# Patient Record
Sex: MALE | Race: BLACK OR AFRICAN AMERICAN | NOT HISPANIC OR LATINO | URBAN - METROPOLITAN AREA
[De-identification: names, ages, dates, MRNs, and addresses within clinical notes are randomized per-mention and may not be internally consistent; named-entity substitution may affect disease eponyms.]

---

## 2017-01-12 ENCOUNTER — INPATIENT (INPATIENT)
Facility: HOSPITAL | Age: 62
LOS: 18 days | Discharge: HOME | End: 2017-01-31
Attending: INTERNAL MEDICINE

## 2017-04-13 ENCOUNTER — INPATIENT (INPATIENT)
Facility: HOSPITAL | Age: 62
LOS: 14 days | Discharge: NURSING, REHAB OR LONG-TERM CARE | End: 2017-04-28
Attending: PSYCHIATRY & NEUROLOGY

## 2017-05-09 ENCOUNTER — INPATIENT (INPATIENT)
Facility: HOSPITAL | Age: 62
LOS: 0 days | Discharge: HOME | End: 2017-05-10
Attending: RADIOLOGY

## 2017-06-09 ENCOUNTER — INPATIENT (INPATIENT)
Facility: HOSPITAL | Age: 62
LOS: 20 days | Discharge: HOME | End: 2017-06-30
Attending: PSYCHIATRY & NEUROLOGY

## 2017-06-09 DIAGNOSIS — F43.10 POST-TRAUMATIC STRESS DISORDER, UNSPECIFIED: ICD-10-CM

## 2017-06-09 DIAGNOSIS — F41.9 ANXIETY DISORDER, UNSPECIFIED: ICD-10-CM

## 2017-06-09 DIAGNOSIS — F14.10 COCAINE ABUSE, UNCOMPLICATED: ICD-10-CM

## 2017-06-09 DIAGNOSIS — E66.9 OBESITY, UNSPECIFIED: ICD-10-CM

## 2017-06-09 DIAGNOSIS — I10 ESSENTIAL (PRIMARY) HYPERTENSION: ICD-10-CM

## 2017-06-09 DIAGNOSIS — F10.20 ALCOHOL DEPENDENCE, UNCOMPLICATED: ICD-10-CM

## 2017-06-09 DIAGNOSIS — F32.9 MAJOR DEPRESSIVE DISORDER, SINGLE EPISODE, UNSPECIFIED: ICD-10-CM

## 2017-06-09 DIAGNOSIS — N40.1 BENIGN PROSTATIC HYPERPLASIA WITH LOWER URINARY TRACT SYMPTOMS: ICD-10-CM

## 2017-06-09 DIAGNOSIS — E78.00 PURE HYPERCHOLESTEROLEMIA, UNSPECIFIED: ICD-10-CM

## 2017-06-09 DIAGNOSIS — Z76.5 MALINGERER [CONSCIOUS SIMULATION]: ICD-10-CM

## 2017-06-09 DIAGNOSIS — I25.10 ATHEROSCLEROTIC HEART DISEASE OF NATIVE CORONARY ARTERY WITHOUT ANGINA PECTORIS: ICD-10-CM

## 2017-06-09 DIAGNOSIS — T14.91 SUICIDE ATTEMPT: ICD-10-CM

## 2017-06-09 DIAGNOSIS — R56.9 UNSPECIFIED CONVULSIONS: ICD-10-CM

## 2017-06-09 DIAGNOSIS — K92.2 GASTROINTESTINAL HEMORRHAGE, UNSPECIFIED: ICD-10-CM

## 2017-06-09 DIAGNOSIS — M25.559 PAIN IN UNSPECIFIED HIP: ICD-10-CM

## 2017-06-09 DIAGNOSIS — E10.9 TYPE 1 DIABETES MELLITUS WITHOUT COMPLICATIONS: ICD-10-CM

## 2017-06-09 DIAGNOSIS — M54.9 DORSALGIA, UNSPECIFIED: ICD-10-CM

## 2017-06-09 DIAGNOSIS — D64.9 ANEMIA, UNSPECIFIED: ICD-10-CM

## 2017-06-09 DIAGNOSIS — F39 UNSPECIFIED MOOD [AFFECTIVE] DISORDER: ICD-10-CM

## 2017-06-09 DIAGNOSIS — F31.9 BIPOLAR DISORDER, UNSPECIFIED: ICD-10-CM

## 2017-06-09 DIAGNOSIS — K21.9 GASTRO-ESOPHAGEAL REFLUX DISEASE WITHOUT ESOPHAGITIS: ICD-10-CM

## 2017-06-09 DIAGNOSIS — G62.9 POLYNEUROPATHY, UNSPECIFIED: ICD-10-CM

## 2017-06-09 DIAGNOSIS — J45.909 UNSPECIFIED ASTHMA, UNCOMPLICATED: ICD-10-CM

## 2017-06-09 DIAGNOSIS — T50.902A POISONING BY UNSPECIFIED DRUGS, MEDICAMENTS AND BIOLOGICAL SUBSTANCES, INTENTIONAL SELF-HARM, INITIAL ENCOUNTER: ICD-10-CM

## 2017-06-28 DIAGNOSIS — K21.9 GASTRO-ESOPHAGEAL REFLUX DISEASE WITHOUT ESOPHAGITIS: ICD-10-CM

## 2017-06-28 DIAGNOSIS — F33.2 MAJOR DEPRESSIVE DISORDER, RECURRENT SEVERE WITHOUT PSYCHOTIC FEATURES: ICD-10-CM

## 2017-06-28 DIAGNOSIS — F10.20 ALCOHOL DEPENDENCE, UNCOMPLICATED: ICD-10-CM

## 2017-06-28 DIAGNOSIS — E78.00 PURE HYPERCHOLESTEROLEMIA, UNSPECIFIED: ICD-10-CM

## 2017-06-28 DIAGNOSIS — N40.0 BENIGN PROSTATIC HYPERPLASIA WITHOUT LOWER URINARY TRACT SYMPTOMS: ICD-10-CM

## 2017-06-28 DIAGNOSIS — E11.40 TYPE 2 DIABETES MELLITUS WITH DIABETIC NEUROPATHY, UNSPECIFIED: ICD-10-CM

## 2017-07-06 DIAGNOSIS — F12.10 CANNABIS ABUSE, UNCOMPLICATED: ICD-10-CM

## 2017-07-06 DIAGNOSIS — F43.10 POST-TRAUMATIC STRESS DISORDER, UNSPECIFIED: ICD-10-CM

## 2017-07-06 DIAGNOSIS — Z79.84 LONG TERM (CURRENT) USE OF ORAL HYPOGLYCEMIC DRUGS: ICD-10-CM

## 2017-07-06 DIAGNOSIS — F16.10 HALLUCINOGEN ABUSE, UNCOMPLICATED: ICD-10-CM

## 2017-07-06 DIAGNOSIS — I10 ESSENTIAL (PRIMARY) HYPERTENSION: ICD-10-CM

## 2017-07-06 DIAGNOSIS — G47.33 OBSTRUCTIVE SLEEP APNEA (ADULT) (PEDIATRIC): ICD-10-CM

## 2017-07-06 DIAGNOSIS — F32.9 MAJOR DEPRESSIVE DISORDER, SINGLE EPISODE, UNSPECIFIED: ICD-10-CM

## 2017-07-06 DIAGNOSIS — K21.9 GASTRO-ESOPHAGEAL REFLUX DISEASE WITHOUT ESOPHAGITIS: ICD-10-CM

## 2017-07-06 DIAGNOSIS — Z76.5 MALINGERER [CONSCIOUS SIMULATION]: ICD-10-CM

## 2017-07-06 DIAGNOSIS — R45.851 SUICIDAL IDEATIONS: ICD-10-CM

## 2017-07-06 DIAGNOSIS — Z86.73 PERSONAL HISTORY OF TRANSIENT ISCHEMIC ATTACK (TIA), AND CEREBRAL INFARCTION WITHOUT RESIDUAL DEFICITS: ICD-10-CM

## 2017-07-06 DIAGNOSIS — E11.9 TYPE 2 DIABETES MELLITUS WITHOUT COMPLICATIONS: ICD-10-CM

## 2017-07-06 DIAGNOSIS — E78.5 HYPERLIPIDEMIA, UNSPECIFIED: ICD-10-CM

## 2017-07-06 DIAGNOSIS — T14.91 SUICIDE ATTEMPT: ICD-10-CM
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
## 2017-07-06 DIAGNOSIS — R45.850 HOMICIDAL IDEATIONS: ICD-10-CM

## 2017-07-06 DIAGNOSIS — N40.0 BENIGN PROSTATIC HYPERPLASIA WITHOUT LOWER URINARY TRACT SYMPTOMS: ICD-10-CM

## 2017-07-06 DIAGNOSIS — Z88.1 ALLERGY STATUS TO OTHER ANTIBIOTIC AGENTS STATUS: ICD-10-CM

## 2017-07-06 DIAGNOSIS — F10.20 ALCOHOL DEPENDENCE, UNCOMPLICATED: ICD-10-CM

## 2017-07-06 DIAGNOSIS — I25.10 ATHEROSCLEROTIC HEART DISEASE OF NATIVE CORONARY ARTERY WITHOUT ANGINA PECTORIS: ICD-10-CM

## 2017-07-06 DIAGNOSIS — F14.10 COCAINE ABUSE, UNCOMPLICATED: ICD-10-CM

## 2017-07-06 DIAGNOSIS — Z88.2 ALLERGY STATUS TO SULFONAMIDES: ICD-10-CM

## 2017-07-28 DIAGNOSIS — Z88.1 ALLERGY STATUS TO OTHER ANTIBIOTIC AGENTS STATUS: ICD-10-CM

## 2017-07-28 DIAGNOSIS — E11.9 TYPE 2 DIABETES MELLITUS WITHOUT COMPLICATIONS: ICD-10-CM

## 2017-07-28 DIAGNOSIS — Z88.2 ALLERGY STATUS TO SULFONAMIDES: ICD-10-CM

## 2017-07-28 DIAGNOSIS — F43.10 POST-TRAUMATIC STRESS DISORDER, UNSPECIFIED: ICD-10-CM

## 2017-07-28 DIAGNOSIS — R00.0 TACHYCARDIA, UNSPECIFIED: ICD-10-CM

## 2017-07-28 DIAGNOSIS — I10 ESSENTIAL (PRIMARY) HYPERTENSION: ICD-10-CM

## 2017-07-28 DIAGNOSIS — Z79.4 LONG TERM (CURRENT) USE OF INSULIN: ICD-10-CM

## 2017-07-28 DIAGNOSIS — G47.30 SLEEP APNEA, UNSPECIFIED: ICD-10-CM

## 2017-07-28 DIAGNOSIS — F33.2 MAJOR DEPRESSIVE DISORDER, RECURRENT SEVERE WITHOUT PSYCHOTIC FEATURES: ICD-10-CM

## 2017-07-28 DIAGNOSIS — F14.10 COCAINE ABUSE, UNCOMPLICATED: ICD-10-CM
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
## 2017-07-28 DIAGNOSIS — F32.9 MAJOR DEPRESSIVE DISORDER, SINGLE EPISODE, UNSPECIFIED: ICD-10-CM

## 2017-07-28 DIAGNOSIS — F10.20 ALCOHOL DEPENDENCE, UNCOMPLICATED: ICD-10-CM

## 2017-07-28 DIAGNOSIS — R45.851 SUICIDAL IDEATIONS: ICD-10-CM

## 2017-08-18 DIAGNOSIS — F33.9 MAJOR DEPRESSIVE DISORDER, RECURRENT, UNSPECIFIED: ICD-10-CM

## 2017-08-18 DIAGNOSIS — E11.9 TYPE 2 DIABETES MELLITUS WITHOUT COMPLICATIONS: ICD-10-CM

## 2017-08-18 DIAGNOSIS — F10.20 ALCOHOL DEPENDENCE, UNCOMPLICATED: ICD-10-CM

## 2017-08-18 DIAGNOSIS — Z86.73 PERSONAL HISTORY OF TRANSIENT ISCHEMIC ATTACK (TIA), AND CEREBRAL INFARCTION WITHOUT RESIDUAL DEFICITS: ICD-10-CM

## 2017-08-18 DIAGNOSIS — I10 ESSENTIAL (PRIMARY) HYPERTENSION: ICD-10-CM

## 2017-08-18 DIAGNOSIS — F43.10 POST-TRAUMATIC STRESS DISORDER, UNSPECIFIED: ICD-10-CM

## 2017-08-18 DIAGNOSIS — F14.10 COCAINE ABUSE, UNCOMPLICATED: ICD-10-CM

## 2017-08-18 DIAGNOSIS — Z51.89 ENCOUNTER FOR OTHER SPECIFIED AFTERCARE: ICD-10-CM

## 2017-11-03 ENCOUNTER — INPATIENT (INPATIENT)
Facility: HOSPITAL | Age: 62
LOS: 13 days | Discharge: HOME | End: 2017-11-17
Attending: PSYCHIATRY & NEUROLOGY

## 2017-11-03 DIAGNOSIS — J45.909 UNSPECIFIED ASTHMA, UNCOMPLICATED: ICD-10-CM

## 2017-11-03 DIAGNOSIS — E10.9 TYPE 1 DIABETES MELLITUS WITHOUT COMPLICATIONS: ICD-10-CM

## 2017-11-03 DIAGNOSIS — F10.20 ALCOHOL DEPENDENCE, UNCOMPLICATED: ICD-10-CM

## 2017-11-03 DIAGNOSIS — D64.9 ANEMIA, UNSPECIFIED: ICD-10-CM

## 2017-11-03 DIAGNOSIS — N40.1 BENIGN PROSTATIC HYPERPLASIA WITH LOWER URINARY TRACT SYMPTOMS: ICD-10-CM

## 2017-11-03 DIAGNOSIS — I25.10 ATHEROSCLEROTIC HEART DISEASE OF NATIVE CORONARY ARTERY WITHOUT ANGINA PECTORIS: ICD-10-CM

## 2017-11-03 DIAGNOSIS — F39 UNSPECIFIED MOOD [AFFECTIVE] DISORDER: ICD-10-CM

## 2017-11-03 DIAGNOSIS — F32.9 MAJOR DEPRESSIVE DISORDER, SINGLE EPISODE, UNSPECIFIED: ICD-10-CM

## 2017-11-03 DIAGNOSIS — K21.9 GASTRO-ESOPHAGEAL REFLUX DISEASE WITHOUT ESOPHAGITIS: ICD-10-CM

## 2017-11-03 DIAGNOSIS — M54.9 DORSALGIA, UNSPECIFIED: ICD-10-CM

## 2017-11-03 DIAGNOSIS — M25.559 PAIN IN UNSPECIFIED HIP: ICD-10-CM

## 2017-11-03 DIAGNOSIS — E66.9 OBESITY, UNSPECIFIED: ICD-10-CM

## 2017-11-03 DIAGNOSIS — E78.00 PURE HYPERCHOLESTEROLEMIA, UNSPECIFIED: ICD-10-CM

## 2017-11-03 DIAGNOSIS — R56.9 UNSPECIFIED CONVULSIONS: ICD-10-CM

## 2017-11-03 DIAGNOSIS — I10 ESSENTIAL (PRIMARY) HYPERTENSION: ICD-10-CM

## 2017-11-03 DIAGNOSIS — G62.9 POLYNEUROPATHY, UNSPECIFIED: ICD-10-CM

## 2017-11-03 DIAGNOSIS — F41.9 ANXIETY DISORDER, UNSPECIFIED: ICD-10-CM

## 2017-11-03 DIAGNOSIS — F43.10 POST-TRAUMATIC STRESS DISORDER, UNSPECIFIED: ICD-10-CM

## 2017-11-03 DIAGNOSIS — T50.902A POISONING BY UNSPECIFIED DRUGS, MEDICAMENTS AND BIOLOGICAL SUBSTANCES, INTENTIONAL SELF-HARM, INITIAL ENCOUNTER: ICD-10-CM

## 2017-11-03 DIAGNOSIS — K92.2 GASTROINTESTINAL HEMORRHAGE, UNSPECIFIED: ICD-10-CM

## 2017-11-03 DIAGNOSIS — F31.9 BIPOLAR DISORDER, UNSPECIFIED: ICD-10-CM

## 2017-11-03 DIAGNOSIS — F14.10 COCAINE ABUSE, UNCOMPLICATED: ICD-10-CM

## 2017-11-03 DIAGNOSIS — Z76.5 MALINGERER [CONSCIOUS SIMULATION]: ICD-10-CM

## 2017-11-21 DIAGNOSIS — E11.9 TYPE 2 DIABETES MELLITUS WITHOUT COMPLICATIONS: ICD-10-CM

## 2017-11-21 DIAGNOSIS — Z79.4 LONG TERM (CURRENT) USE OF INSULIN: ICD-10-CM

## 2017-11-21 DIAGNOSIS — F10.20 ALCOHOL DEPENDENCE, UNCOMPLICATED: ICD-10-CM

## 2017-11-21 DIAGNOSIS — K21.9 GASTRO-ESOPHAGEAL REFLUX DISEASE WITHOUT ESOPHAGITIS: ICD-10-CM

## 2017-11-21 DIAGNOSIS — E78.5 HYPERLIPIDEMIA, UNSPECIFIED: ICD-10-CM

## 2017-11-21 DIAGNOSIS — F33.3 MAJOR DEPRESSIVE DISORDER, RECURRENT, SEVERE WITH PSYCHOTIC SYMPTOMS: ICD-10-CM

## 2017-11-21 DIAGNOSIS — F32.9 MAJOR DEPRESSIVE DISORDER, SINGLE EPISODE, UNSPECIFIED: ICD-10-CM

## 2017-11-21 DIAGNOSIS — I25.10 ATHEROSCLEROTIC HEART DISEASE OF NATIVE CORONARY ARTERY WITHOUT ANGINA PECTORIS: ICD-10-CM

## 2017-12-02 ENCOUNTER — EMERGENCY (EMERGENCY)
Facility: HOSPITAL | Age: 62
LOS: 1 days | Discharge: HOME | End: 2017-12-02
Attending: EMERGENCY MEDICINE | Admitting: EMERGENCY MEDICINE
Payer: SELF-PAY

## 2017-12-02 VITALS
RESPIRATION RATE: 18 BRPM | WEIGHT: 199.96 LBS | HEART RATE: 101 BPM | SYSTOLIC BLOOD PRESSURE: 117 MMHG | OXYGEN SATURATION: 92 % | TEMPERATURE: 98 F | DIASTOLIC BLOOD PRESSURE: 69 MMHG

## 2017-12-02 DIAGNOSIS — R41.82 ALTERED MENTAL STATUS, UNSPECIFIED: ICD-10-CM

## 2017-12-02 DIAGNOSIS — T40.3X1A POISONING BY METHADONE, ACCIDENTAL (UNINTENTIONAL), INITIAL ENCOUNTER: ICD-10-CM

## 2017-12-02 LAB
ALBUMIN SERPL ELPH-MCNC: 4.5 G/DL — SIGNIFICANT CHANGE UP (ref 3.4–5)
ALP SERPL-CCNC: 99 U/L — SIGNIFICANT CHANGE UP (ref 40–120)
ALT FLD-CCNC: 36 U/L — SIGNIFICANT CHANGE UP (ref 12–42)
ANION GAP SERPL CALC-SCNC: 11 MMOL/L — SIGNIFICANT CHANGE UP (ref 9–16)
AST SERPL-CCNC: 34 U/L — SIGNIFICANT CHANGE UP (ref 15–37)
BILIRUB SERPL-MCNC: 0.9 MG/DL — SIGNIFICANT CHANGE UP (ref 0.2–1.2)
BUN SERPL-MCNC: 26 MG/DL — HIGH (ref 7–23)
CALCIUM SERPL-MCNC: 10.2 MG/DL — SIGNIFICANT CHANGE UP (ref 8.5–10.5)
CHLORIDE SERPL-SCNC: 101 MMOL/L — SIGNIFICANT CHANGE UP (ref 96–108)
CK MB BLD-MCNC: 0.99 % — SIGNIFICANT CHANGE UP
CK MB CFR SERPL CALC: 6.5 NG/ML — HIGH (ref 0.5–3.6)
CO2 SERPL-SCNC: 28 MMOL/L — SIGNIFICANT CHANGE UP (ref 22–31)
CREAT SERPL-MCNC: 1.93 MG/DL — HIGH (ref 0.5–1.3)
ETHANOL SERPL-MCNC: <3 MG/DL — SIGNIFICANT CHANGE UP
GLUCOSE SERPL-MCNC: 195 MG/DL — HIGH (ref 70–99)
HCT VFR BLD CALC: 42.1 % — SIGNIFICANT CHANGE UP (ref 39–50)
HGB BLD-MCNC: 13.8 G/DL — SIGNIFICANT CHANGE UP (ref 13–17)
MCHC RBC-ENTMCNC: 26 PG — LOW (ref 27–34)
MCHC RBC-ENTMCNC: 32.8 G/DL — SIGNIFICANT CHANGE UP (ref 32–36)
MCV RBC AUTO: 79.3 FL — LOW (ref 80–100)
PCP SPEC-MCNC: SIGNIFICANT CHANGE UP
PLATELET # BLD AUTO: 210 K/UL — SIGNIFICANT CHANGE UP (ref 150–400)
POTASSIUM SERPL-MCNC: 5.4 MMOL/L — HIGH (ref 3.5–5.3)
POTASSIUM SERPL-SCNC: 5.4 MMOL/L — HIGH (ref 3.5–5.3)
PROT SERPL-MCNC: 8.8 G/DL — HIGH (ref 6.4–8.2)
RBC # BLD: 5.31 M/UL — SIGNIFICANT CHANGE UP (ref 4.2–5.8)
RBC # FLD: 16.2 % — SIGNIFICANT CHANGE UP (ref 10.3–16.9)
SODIUM SERPL-SCNC: 140 MMOL/L — SIGNIFICANT CHANGE UP (ref 132–145)
TROPONIN I SERPL-MCNC: 0.02 NG/ML — SIGNIFICANT CHANGE UP (ref 0.02–0.06)
WBC # BLD: 12.9 K/UL — HIGH (ref 3.8–10.5)
WBC # FLD AUTO: 12.9 K/UL — HIGH (ref 3.8–10.5)

## 2017-12-02 PROCEDURE — 93010 ELECTROCARDIOGRAM REPORT: CPT

## 2017-12-02 PROCEDURE — 70450 CT HEAD/BRAIN W/O DYE: CPT | Mod: 26

## 2017-12-02 PROCEDURE — 99285 EMERGENCY DEPT VISIT HI MDM: CPT | Mod: 25

## 2017-12-02 NOTE — ED PROVIDER NOTE - MUSCULOSKELETAL, MLM
Spine appears normal, range of motion is not limited, no muscle or joint tenderness. back no signs of trauma

## 2017-12-02 NOTE — ED PROVIDER NOTE - PROGRESS NOTE DETAILS
Heart rate 79, /67, O2 sat 96, slowly improving mental status waking up Awake, wants to leave, lives in [place].

## 2017-12-02 NOTE — ED ADULT NURSE NOTE - OBJECTIVE STATEMENT
patient to ER by EMS, patient found unresponsive on street. EMS reported pin point pupils and gave Narcan 2mg. Patient arousable on arrival, responsive to touch. patient became more responsive to voice. No obvious signs of trauma noted. Respirations unlabored, in no acute distress. denies chest pain/sob, no abdominal pain. attached to CM, o2 sat monitor. labs sent. awaiting further medical disposition, test results, will continue to monitor.

## 2017-12-02 NOTE — ED PROVIDER NOTE - OBJECTIVE STATEMENT
Pt is a 62 y/o M c/o of an AMS. Pt was found unconscious on [address] and was brought in by EMS. EMS administered 2 mgs nasal Narcan, and O2 through a mask. Pt is a 62 y/o M c/o of an AMS. Pt was found with decreased responsiveness on [address] and was brought in by EMS.

## 2017-12-02 NOTE — ED ADULT TRIAGE NOTE - CHIEF COMPLAINT QUOTE
Pt was found [address] on the ground unresponsive. EMS states pin point pupils and gave 2mg IN Narcan and pt become more arousable. Pt arrives with O2 on and responsive to tactile stimuli. No deformities or trauma noted. Pt not verbal.

## 2017-12-02 NOTE — ED ADULT NURSE REASSESSMENT NOTE - NS ED NURSE REASSESS COMMENT FT1
patient resting on stretcher, remains responsive to all stimuli, breathing unlabored, in no acute distress. awaiting further medical disposition. safety precautions maintained, will continue to monitor.

## 2017-12-03 VITALS
OXYGEN SATURATION: 98 % | TEMPERATURE: 97 F | DIASTOLIC BLOOD PRESSURE: 73 MMHG | RESPIRATION RATE: 18 BRPM | HEART RATE: 102 BPM | SYSTOLIC BLOOD PRESSURE: 107 MMHG

## 2018-01-04 ENCOUNTER — INPATIENT (INPATIENT)
Facility: HOSPITAL | Age: 63
LOS: 11 days | Discharge: HOME | End: 2018-01-16
Attending: PSYCHIATRY & NEUROLOGY

## 2018-01-04 DIAGNOSIS — E78.00 PURE HYPERCHOLESTEROLEMIA, UNSPECIFIED: ICD-10-CM

## 2018-01-04 DIAGNOSIS — T50.902A POISONING BY UNSPECIFIED DRUGS, MEDICAMENTS AND BIOLOGICAL SUBSTANCES, INTENTIONAL SELF-HARM, INITIAL ENCOUNTER: ICD-10-CM

## 2018-01-04 DIAGNOSIS — Z76.5 MALINGERER [CONSCIOUS SIMULATION]: ICD-10-CM

## 2018-01-04 DIAGNOSIS — D64.9 ANEMIA, UNSPECIFIED: ICD-10-CM

## 2018-01-04 DIAGNOSIS — F14.10 COCAINE ABUSE, UNCOMPLICATED: ICD-10-CM

## 2018-01-04 DIAGNOSIS — F10.20 ALCOHOL DEPENDENCE, UNCOMPLICATED: ICD-10-CM

## 2018-01-04 DIAGNOSIS — K21.9 GASTRO-ESOPHAGEAL REFLUX DISEASE WITHOUT ESOPHAGITIS: ICD-10-CM

## 2018-01-04 DIAGNOSIS — M25.559 PAIN IN UNSPECIFIED HIP: ICD-10-CM

## 2018-01-04 DIAGNOSIS — G62.9 POLYNEUROPATHY, UNSPECIFIED: ICD-10-CM

## 2018-01-04 DIAGNOSIS — F31.9 BIPOLAR DISORDER, UNSPECIFIED: ICD-10-CM

## 2018-01-04 DIAGNOSIS — J45.909 UNSPECIFIED ASTHMA, UNCOMPLICATED: ICD-10-CM

## 2018-01-04 DIAGNOSIS — F41.9 ANXIETY DISORDER, UNSPECIFIED: ICD-10-CM

## 2018-01-04 DIAGNOSIS — R56.9 UNSPECIFIED CONVULSIONS: ICD-10-CM

## 2018-01-04 DIAGNOSIS — I10 ESSENTIAL (PRIMARY) HYPERTENSION: ICD-10-CM

## 2018-01-04 DIAGNOSIS — E10.9 TYPE 1 DIABETES MELLITUS WITHOUT COMPLICATIONS: ICD-10-CM

## 2018-01-04 DIAGNOSIS — N40.1 BENIGN PROSTATIC HYPERPLASIA WITH LOWER URINARY TRACT SYMPTOMS: ICD-10-CM

## 2018-01-04 DIAGNOSIS — I25.10 ATHEROSCLEROTIC HEART DISEASE OF NATIVE CORONARY ARTERY WITHOUT ANGINA PECTORIS: ICD-10-CM

## 2018-01-04 DIAGNOSIS — F43.10 POST-TRAUMATIC STRESS DISORDER, UNSPECIFIED: ICD-10-CM

## 2018-01-04 DIAGNOSIS — M54.9 DORSALGIA, UNSPECIFIED: ICD-10-CM

## 2018-01-23 DIAGNOSIS — F33.2 MAJOR DEPRESSIVE DISORDER, RECURRENT SEVERE WITHOUT PSYCHOTIC FEATURES: ICD-10-CM

## 2018-01-23 DIAGNOSIS — I25.10 ATHEROSCLEROTIC HEART DISEASE OF NATIVE CORONARY ARTERY WITHOUT ANGINA PECTORIS: ICD-10-CM

## 2018-01-23 DIAGNOSIS — T60.4X2A: ICD-10-CM

## 2018-01-23 DIAGNOSIS — F43.10 POST-TRAUMATIC STRESS DISORDER, UNSPECIFIED: ICD-10-CM

## 2018-01-23 DIAGNOSIS — E11.9 TYPE 2 DIABETES MELLITUS WITHOUT COMPLICATIONS: ICD-10-CM

## 2018-01-23 DIAGNOSIS — E87.5 HYPERKALEMIA: ICD-10-CM
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
## 2018-01-23 DIAGNOSIS — F17.210 NICOTINE DEPENDENCE, CIGARETTES, UNCOMPLICATED: ICD-10-CM

## 2018-01-23 DIAGNOSIS — F14.10 COCAINE ABUSE, UNCOMPLICATED: ICD-10-CM

## 2018-01-23 DIAGNOSIS — I10 ESSENTIAL (PRIMARY) HYPERTENSION: ICD-10-CM

## 2018-01-23 DIAGNOSIS — F10.10 ALCOHOL ABUSE, UNCOMPLICATED: ICD-10-CM

## 2018-01-23 DIAGNOSIS — Z91.5 PERSONAL HISTORY OF SELF-HARM: ICD-10-CM

## 2018-02-06 ENCOUNTER — EMERGENCY (EMERGENCY)
Facility: HOSPITAL | Age: 63
LOS: 0 days | Discharge: HOME | End: 2018-02-06
Attending: EMERGENCY MEDICINE

## 2018-02-06 VITALS
SYSTOLIC BLOOD PRESSURE: 121 MMHG | HEART RATE: 102 BPM | TEMPERATURE: 97 F | RESPIRATION RATE: 18 BRPM | OXYGEN SATURATION: 98 % | DIASTOLIC BLOOD PRESSURE: 70 MMHG

## 2018-02-06 VITALS
DIASTOLIC BLOOD PRESSURE: 59 MMHG | SYSTOLIC BLOOD PRESSURE: 103 MMHG | OXYGEN SATURATION: 96 % | RESPIRATION RATE: 18 BRPM | HEART RATE: 110 BPM | TEMPERATURE: 98 F

## 2018-02-06 DIAGNOSIS — F32.9 MAJOR DEPRESSIVE DISORDER, SINGLE EPISODE, UNSPECIFIED: ICD-10-CM

## 2018-02-06 DIAGNOSIS — Z88.2 ALLERGY STATUS TO SULFONAMIDES: ICD-10-CM

## 2018-02-06 DIAGNOSIS — F14.10 COCAINE ABUSE, UNCOMPLICATED: ICD-10-CM

## 2018-02-06 DIAGNOSIS — E11.9 TYPE 2 DIABETES MELLITUS WITHOUT COMPLICATIONS: ICD-10-CM

## 2018-02-06 DIAGNOSIS — T40.5X2A POISONING BY COCAINE, INTENTIONAL SELF-HARM, INITIAL ENCOUNTER: ICD-10-CM

## 2018-02-06 DIAGNOSIS — T40.1X2A POISONING BY HEROIN, INTENTIONAL SELF-HARM, INITIAL ENCOUNTER: ICD-10-CM

## 2018-02-06 DIAGNOSIS — F10.10 ALCOHOL ABUSE, UNCOMPLICATED: ICD-10-CM

## 2018-02-06 LAB
ALBUMIN SERPL ELPH-MCNC: 3.8 G/DL — SIGNIFICANT CHANGE UP (ref 3–5.5)
ALP SERPL-CCNC: 77 U/L — SIGNIFICANT CHANGE UP (ref 30–115)
ALT FLD-CCNC: 22 U/L — SIGNIFICANT CHANGE UP (ref 0–41)
ANION GAP SERPL CALC-SCNC: 13 MMOL/L — SIGNIFICANT CHANGE UP (ref 7–14)
APAP SERPL-MCNC: <10 UG/ML — SIGNIFICANT CHANGE UP (ref 10–30)
AST SERPL-CCNC: 33 U/L — SIGNIFICANT CHANGE UP (ref 0–41)
BASOPHILS # BLD AUTO: 0.03 K/UL — SIGNIFICANT CHANGE UP (ref 0–0.2)
BASOPHILS NFR BLD AUTO: 0.5 % — SIGNIFICANT CHANGE UP (ref 0–1)
BILIRUB DIRECT SERPL-MCNC: 0.2 MG/DL — SIGNIFICANT CHANGE UP (ref 0–0.2)
BILIRUB INDIRECT FLD-MCNC: 0.4 MG/DL — SIGNIFICANT CHANGE UP
BILIRUB SERPL-MCNC: 0.6 MG/DL — SIGNIFICANT CHANGE UP (ref 0.2–1.2)
BUN SERPL-MCNC: 8 MG/DL — LOW (ref 10–20)
CALCIUM SERPL-MCNC: 9.4 MG/DL — SIGNIFICANT CHANGE UP (ref 8.5–10.1)
CHLORIDE SERPL-SCNC: 100 MMOL/L — SIGNIFICANT CHANGE UP (ref 98–110)
CHOLEST SERPL-MCNC: 188 MG/DL — SIGNIFICANT CHANGE UP (ref 100–200)
CO2 SERPL-SCNC: 20 MMOL/L — SIGNIFICANT CHANGE UP (ref 17–32)
CREAT SERPL-MCNC: 1.2 MG/DL — SIGNIFICANT CHANGE UP (ref 0.7–1.5)
EOSINOPHIL # BLD AUTO: 0.06 K/UL — SIGNIFICANT CHANGE UP (ref 0–0.7)
EOSINOPHIL NFR BLD AUTO: 1 % — SIGNIFICANT CHANGE UP (ref 0–8)
ETHANOL SERPL-MCNC: <5 MG/DL — SIGNIFICANT CHANGE UP
GLUCOSE SERPL-MCNC: 224 MG/DL — HIGH (ref 70–110)
HCT VFR BLD CALC: 37.7 % — LOW (ref 42–52)
HDLC SERPL-MCNC: 69 MG/DL — HIGH (ref 40–60)
HGB BLD-MCNC: 12.2 G/DL — LOW (ref 14–18)
IMM GRANULOCYTES NFR BLD AUTO: 0.3 % — SIGNIFICANT CHANGE UP (ref 0.1–0.3)
LIPID PNL WITH DIRECT LDL SERPL: 107 MG/DL — HIGH (ref 50–100)
LYMPHOCYTES # BLD AUTO: 2.63 K/UL — SIGNIFICANT CHANGE UP (ref 1.2–3.4)
LYMPHOCYTES # BLD AUTO: 43 % — SIGNIFICANT CHANGE UP (ref 20.5–51.1)
MCHC RBC-ENTMCNC: 24.4 PG — LOW (ref 27–31)
MCHC RBC-ENTMCNC: 32.4 G/DL — SIGNIFICANT CHANGE UP (ref 32–37)
MCV RBC AUTO: 75.2 FL — LOW (ref 80–94)
MONOCYTES # BLD AUTO: 0.6 K/UL — SIGNIFICANT CHANGE UP (ref 0.1–0.6)
MONOCYTES NFR BLD AUTO: 9.8 % — HIGH (ref 1.7–9.3)
NEUTROPHILS # BLD AUTO: 2.78 K/UL — SIGNIFICANT CHANGE UP (ref 1.4–6.5)
NEUTROPHILS NFR BLD AUTO: 45.4 % — SIGNIFICANT CHANGE UP (ref 42.2–75.2)
NRBC # BLD: 0 /100 WBCS — SIGNIFICANT CHANGE UP (ref 0–0)
PLATELET # BLD AUTO: 279 K/UL — SIGNIFICANT CHANGE UP (ref 130–400)
POTASSIUM SERPL-MCNC: 3.8 MMOL/L — SIGNIFICANT CHANGE UP (ref 3.5–5)
POTASSIUM SERPL-SCNC: 3.8 MMOL/L — SIGNIFICANT CHANGE UP (ref 3.5–5)
PROT SERPL-MCNC: 6.6 G/DL — SIGNIFICANT CHANGE UP (ref 6–8)
RBC # BLD: 5.01 M/UL — SIGNIFICANT CHANGE UP (ref 4.7–6.1)
RBC # FLD: 17 % — HIGH (ref 11.5–14.5)
SALICYLATES SERPL-MCNC: <4 MG/DL — SIGNIFICANT CHANGE UP (ref 4–30)
SODIUM SERPL-SCNC: 133 MMOL/L — LOW (ref 135–146)
TOTAL CHOLESTEROL/HDL RATIO MEASUREMENT: 2.7 RATIO — LOW (ref 4–5.5)
TRIGL SERPL-MCNC: 169 MG/DL — HIGH (ref 40–150)
WBC # BLD: 6.12 K/UL — SIGNIFICANT CHANGE UP (ref 4.8–10.8)
WBC # FLD AUTO: 6.12 K/UL — SIGNIFICANT CHANGE UP (ref 4.8–10.8)

## 2018-02-06 NOTE — ED PROVIDER NOTE - NS ED ROS FT
Eyes:  +blurry vision. No eye pain or discharge.  ENMT:  No hearing changes, pain, discharge or infections. No neck pain or stiffness.  Cardiac:  No chest pain, SOB or edema.   Respiratory:  No cough or respiratory distress.  GI:  +nausea, 6-7 episodes vomiting. No diarrhea  :  No dysuria, frequency or burning.  MS:  No myalgia, muscle weakness, joint pain or back pain.  Neuro: +headache. No weakness.  No LOC.  Skin:  No skin rash.

## 2018-02-06 NOTE — ED PROVIDER NOTE - OBJECTIVE STATEMENT
62 y m pmh dm, stroke, ptsd, prior suicide attempts presenting after a suicide attempt. Took cocaine, heroin, 1 bottle of rubbing alcohol, and a handful of rat poison at 3 am today. Endorses 6-7 episodes of vomiting, nausea, blurry vision, ha. Says he sees men marching on his stretcher and is hearing voices. No hx of schizophrenia. Denies other substance abuse or ingestion of tylenol, etoh, salicylates. Endorses suicidal intent, denies homocidal ideation. Denies abdominal pain, back pain, cp, sob.

## 2018-02-06 NOTE — ED PROVIDER NOTE - ATTENDING CONTRIBUTION TO CARE
61 y/o male with hx of CVA, DM, depression. [place] depressed today. Injected cocaine and heroin. Drank a bottle of isopropyl and ate "rat poison" (does not know what type). Currently awake and alert. Actively vomiting. O/E: Pulse 102. Afebrile. PERRL. Pupils normal size. EOMI. No pallor, no jaundice. Neck supple, no meningeal signs. Lungs CTA b/l. S1 S2 regular, no murmur. ABD with normal bowel sounds, soft, no tenderness, non-distended bladder. No pedal edema, no calf tenderness. No skin rash. No neurologic deficits.   Imp: Not sympathomimetic. No respiratory depression, no acute opioid toxicity. Does not appear intoxicated.  A/P: Will check finger stick. EKG, VBG, PT/PTT, calcium, acetone, AG. Actively vomiting - will hold off on charcoal. Will monitor and re-assess.

## 2018-02-06 NOTE — ED BEHAVIORAL HEALTH ASSESSMENT NOTE - DESCRIPTION
Pt was lying in assigned bed. Pt was initially cooperative with interview, but became defensive when asked about past psych treatment. Pt does not appear internally preoccupied, appears well-related, does not show signs or symptoms consistent with norma or psychosis at this time. DM II Pt reports being domiciled in apartment but previously reported being undomiciled or living in shelter. Retired [employer]  on disability. Per chart, pt has siblings and mother who live nearby, but denies having any family on present interview.

## 2018-02-06 NOTE — ED PROVIDER NOTE - PROGRESS NOTE DETAILS
Spoke with psych, will come see patient. Patient comfortably lying in bed, tachycardic to 115. FS glucose 196. Spoke with Dr. Arizmendi, said patient is well known to the psychiatry team. Hx of malingering. Comes in every few months claiming he has swallowed rat poison or drank rubbing alcohol, but it is never substantiated by medical testing. Has been admitted inpatient within the past month and becomes antisocial and does not respond well to IPP care. Pt does not follow up with psychiatry outpatient. Does not think patient will benefit from IPP at this time. Believes due to his extensive history with the psychiatry team and antisocial behavior inpatient, he is cleared from psychiatry as long as medical workup is negative. Seen by psych and cleared. Will continue to observe. VBG results: pH 7.446, pCO2 34.2, PO2 75.4, Na 135, K 4.0, Ca 1.20, Glu 249, Lac 1.0, VBG results: pH 7.446, pCO2 34.2, PO2 75.4, Na 135, K 4.0, Ca 1.20, Glu 249, Lac 1.0 s/o to Dr. Morales. Pt cleared by psych. Ok for discharge. Will discharge and pt is to f/u in 24-48 hrs for INR check. Acknowledged sign out from Dr. simpson.  Patient now asytmptomatic.  tolerating PO.  Labs ok.  Patient with normal vital signs.  He was cleared by psychiatry who knows patient well.  Only instructions for patient was to return in 48-72 hours for repeat INR or follow up as outpatient to have completed.  Otherwise, stable from toxicology standpoint for discharge home.

## 2018-02-06 NOTE — ED BEHAVIORAL HEALTH ASSESSMENT NOTE - SUICIDE RISK FACTORS
Access to means (pills, firearms, etc.)/Substance abuse/dependence/Unable to engage in safety planning/Mood episode

## 2018-02-06 NOTE — ED BEHAVIORAL HEALTH ASSESSMENT NOTE - HPI (INCLUDE ILLNESS QUALITY, SEVERITY, DURATION, TIMING, CONTEXT, MODIFYING FACTORS, ASSOCIATED SIGNS AND SYMPTOMS)
61 yo  male, single, domiciled, on disability, retired  with history of Alcohol Use Disorder, Cocaine Use Disorder, reported hx of depression and PTSD, recently discharged from [facility] on 1/16/18 referred self to ED reporting suicide attempt by ingesting "rubbing alcohol, rat poison, and shooting up heroin and cocaine" at 3 am this morning because he felt depressed. Pt is well known to Psych team due to frequent ED visits and inpatient psychiatric hospitalizations (5+ in past year) with similar presenting complaints of ingesting caustic substances including Drano, rat poison, bleach, cocaine, heroin, that are unsubstantiated by labs and medical workup. Per chart review, pt's inpatient hospitalizations are marked by nonadherence with treatment, antisocial behavior, presentation suspicious of malingering, and followed by noncompliance with outpatient treatment. Today, pt reports that he has been feeling "depressed" for a few days "because of my PTSD" despite adherence with medications (Prozac, Wellbutrin, Trazodone), which he gets from the [facility] ED. This is inconsistent with medical records, as pt was recently discharged to [facility] on Fluoxetine 20mg, Haldol 2mg Gabapentin 300mg q8. When asked about past [facility] admissions and treatment, pt answered "I don't know, I have memory problems". Pt became irritable on further questioning of depressive symptoms, responding with "I don't know, I have memory problems", but is focused on gaining admission. Pt reports current alcohol and cocaine use and is motivated to go to Rehab. Pt reported seeing "little green men running around", but does not appear internally preoccupied, appears well-related, does not show signs or symptoms consistent with norma or psychosis at this time. 63 yo  male, single, domiciled, on disability, retired  with history of Alcohol Use Disorder, Cocaine Use Disorder, reported hx of depression and PTSD, recently discharged from [facility] on 1/16/18 referred self to ED reporting suicide attempt by ingesting "rubbing alcohol, rat poison, and shooting up heroin and cocaine" at 3 am this morning because he felt depressed. Pt is well known to Psych team due to frequent ED visits and inpatient psychiatric hospitalizations (5+ in past year) with similar presenting complaints of ingesting caustic substances including Drano, rat poison, bleach, cocaine, heroin, that are unsubstantiated by labs and medical workup. Per chart review, pt's inpatient hospitalizations are marked by nonadherence with treatment, antisocial behavior, presentation suspicious of malingering, and followed by noncompliance with outpatient treatment. Today, pt reports that he has been feeling "depressed" for a few days "because of my PTSD" despite adherence with medications (Prozac, Wellbutrin, Trazodone), which he gets from the [facility] ED. This is inconsistent with medical records, as pt was recently discharged to [facility] on Fluoxetine 20mg, Haldol 2mg Gabapentin 300mg q8. When asked about past [facility] admissions and treatment, pt answered "I don't know, I have memory problems". Pt became irritable on further questioning of depressive symptoms, responding with "I don't know, I have memory problems", but is focused on gaining admission. Pt reports current alcohol and cocaine use and is motivated to go to Rehab. Pt reported seeing "little green men running around", but does not appear internally preoccupied, appears well-related, does not show signs or symptoms consistent with norma or psychosis at this time.  Patient also reported on a plan to go to inpatient rehabilitation post discharged from the hospital.  He was unable to provide clear goals for the hospitalization and he also reported that he does not follow up with outpatient treatment because "it doesn't work."  However, he cannot identify how psychiatric admission might help him.  In the past, upon clearance by psychiatry the patient has become agitated.

## 2018-02-06 NOTE — ED BEHAVIORAL HEALTH ASSESSMENT NOTE - AXIS IV
Problem related to social environment/Other psychosocial and environmental problems/Problems with access to healthcare services/Problems with primary support

## 2018-02-06 NOTE — ED BEHAVIORAL HEALTH ASSESSMENT NOTE - CASE SUMMARY
62M with a history of cocaine, heroin, and alcohol use disorders along with past diagnoses of PTSD and MDD who presented to the ED reporting suicidal ideation.  Upon reaching the ED, the patient reported that at 3 am he ingested a bottle of rubbing alcohol, a handful of rat poison, heroin, and cocaine to kill himself.  He was notably calm and cooperative, but then reportedly threatened to kill another patient in the ED and so was placed in isolation.  The patient went on to report multiple symptoms including visual hallucinations of “little green men running on the stretchers” and vague reports of auditory hallucinations.  He was observed to be calm, sitting in his stretcher without notable signs of psychosis or mood disruption that would support his reported symptoms.  On examination he was vague about his symptoms and was would not engage in treatment planning whether inpatient or outpatient.  He has a history of reporting a similar story of overdose by rat poison and other substances leading to past psychiatric hospitalizations.  On the inpatient unit he has a history of disrupting discharge, antisocial behaviors, and non-adherence to treatment.  At this point in time the patient is exaggerating symptoms such as visual hallucinations and auditory hallucinations that are not supported by psychiatric examination.  He appears to have a goal of secondary gain to obtain admission though the reason for this is unclear.  While medical work up and observation under toxicology due to the reported ingestion are warranted, it does not appear the patient would benefit from psychiatric hospitalization.  His suicidal intent is in doubt because he brought himself to the ED, he is future oriented, he reports on goals, and he is otherwise reporting psychiatric symptoms that are not present as signs on his mental status exam.  He has elevated chronic risk for self harm particulary due to his report of self-injurious behavior and the possibility of escalating behaviors to obtain secondary gain.  However, past admissions and current inpatient psychiatric admission would not appear to benefit the patient.  He does not present with modifiable risk factors.  In fact, psychiatric admissions in the past have appeared to promote risky behaviors.

## 2018-02-06 NOTE — ED BEHAVIORAL HEALTH ASSESSMENT NOTE - DETAILS
admitted to [facility] from [facility] 1/4/18 - 1/16/18 Pt reported ingesting alcohol and cocaine in suicide attempt earlier today, but per medical records pt has history of presenting with similar complaints unsubstantiated by medical workup for possible secondary gain per pt, retired  deferred discussed with referent discussed with ED team Contacted Dr. Escoto

## 2018-02-06 NOTE — ED BEHAVIORAL HEALTH ASSESSMENT NOTE - RISK ASSESSMENT
Pt is at chronically elevated risk due to substance use, multiple past admissions, reported suicide attempt, antisocial behavior and nonadherence with treatment. Currently, endorses low mood. However, pt is future oriented, interested in rehab, contracts for safety, has fear of death and pain. Pt's risk factors appear to be chronic and are unlikely to be modified by IPP admission.

## 2018-02-06 NOTE — ED BEHAVIORAL HEALTH ASSESSMENT NOTE - DIFFERENTIAL
Depressive Disorder NEC vs Substance Induced Depressive Disorder  Alcohol Use Disorder  Cocaine Use Disorder  R/o Malingering

## 2018-02-06 NOTE — ED BEHAVIORAL HEALTH ASSESSMENT NOTE - DESCRIPTION (FIRST USE, LAST USE, QUANTITY, FREQUENCY, DURATION)
smokes 1 ppd daily use, reports 5-6 drinks per day reports current use, reports injecting cocaine this morning reports current use, reports injecting heroin this morning

## 2018-02-06 NOTE — ED ADULT TRIAGE NOTE - CHIEF COMPLAINT QUOTE
Pt states he vomited blood 6 times this AM after drinking rat poison, injecting heroin and cocaine and taking a handful of trazodone in a suicide attempt at about 3 AM. Pt states he has attempted suicide in the past using a similar method.

## 2018-02-06 NOTE — ED BEHAVIORAL HEALTH ASSESSMENT NOTE - OTHER PAST PSYCHIATRIC HISTORY (INCLUDE DETAILS REGARDING ONSET, COURSE OF ILLNESS, INPATIENT/OUTPATIENT TREATMENT)
Multiple past IPP admissions (5 at [facility] in past year), Gemma LUONG with similar presenting complaints of ingesting caustic substances including Drano, rat poison, bleach, cocaine, heroin, that are unsubstantiated by labs and medical workup. Per chart review, pt's inpatient hospitalizations are marked by nonadherence with treatment, presentation suspicious of malingering, and followed by noncompliance with outpatient treatment. Multiple past IPP admissions (5 at [facility] in past year), Gemma LUONG with similar presenting complaints of ingesting caustic substances including Drano, rat poison, bleach, cocaine, heroin, that are unsubstantiated by labs and medical workup. Per chart review, pt's inpatient hospitalizations are marked by nonadherence with treatment, presentation suspicious of malingering, and followed by noncompliance with outpatient treatment.  Past psychiatric admissions have included non-adherence with discharge planning.

## 2018-02-06 NOTE — ED BEHAVIORAL HEALTH ASSESSMENT NOTE - NS ED BHA ED COURSE UTILIZATION OF 1 TO 1 IN ED YN
17 lbs since last visit in April 2016  Exercising 4-5 days a week, mostly cardio with weights  Low carb diet, no sugary beverages, high vegetable  Sometimes feeling tired, constipated  No hair loss, heat or cold intolerance     Yes

## 2018-02-06 NOTE — ED BEHAVIORAL HEALTH ASSESSMENT NOTE - CURRENT MEDICATION
Per pt, Fluoxetine, Trazodone, Wellbutrin. Per chart review, discharged from IPP on Fluoxetine 20mg, Haldol 2mg Gabapentin 300mg q8

## 2018-02-06 NOTE — ED BEHAVIORAL HEALTH ASSESSMENT NOTE - SUMMARY
63 yo  male, single, domiciled, on disability, retired  with history of Alcohol Use Disorder, Cocaine Use Disorder, reported hx of depression and PTSD, frequent ED visits and IPP hospitalizations for stereotypical reports of suicide attempt by ingesting caustic substances unsubstantiated by labs and medical workup, multiple rehab and detox admissions, multiple presentations suspicious of malingering, nonadherence with outpatient treatment, presents to ED with complaint of ingesting toxic substance in suicide attempt. Pt reports feeling depressed, but appears to be resting comfortably, in no apparent distress, well related, with no apparent symptoms consistent with psychosis or norma. Pt is well known to Psych team for similar presentations with goal of gaining admission. Pt's inpatient hospitalizations are marked by nonadherence with treatment, antisocial behavior, presentation suspicious of malingering, and followed by noncompliance with outpatient treatment. Pt may benefit from outpatient psychotherapy, however he does not appear to warrant inpatient admission as he does not seem to be at imminent risk to himself based on assessment and chart review. Given pt's antisocial behavior, ongoing substance use, and nonadherence to outpatient follow up, pt is unlikely to benefit further from IPP admission. Recommend that pt be held in ED for medical workup and observation given his report of toxic ingestion.

## 2018-02-06 NOTE — ED PROVIDER NOTE - PHYSICAL EXAMINATION
CONSTITUTIONAL: Well-developed; well-nourished; in no acute distress.   SKIN: warm, dry  HEAD: Normocephalic; atraumatic.  EYES: PERRL, EOMI, normal sclera and conjunctiva   ENT: No nasal discharge; airway clear.  NECK: Supple; non tender.  CARD: S1, S2 normal; no murmurs, gallops, or rubs. Regular rate and rhythm.   RESP: No wheezes, rales or rhonchi.  ABD: Mild tenderness periumbilically. Soft, nondistended.   EXT: Normal ROM.  No clubbing, cyanosis or edema.   LYMPH: No acute cervical adenopathy.  NEURO: Alert, oriented, grossly unremarkable. A&Ox4. Cranial nerves intact, moving all extremities.   PSYCH: Cooperative, answering questions appropriately. Endorses suicidal ideation, visual and auditory hallucination. Suicide attempt earlier today. Denies homocidal ideation.

## 2018-02-06 NOTE — ED ADULT NURSE NOTE - CAS DISCH CONDITION
Spoke with mother. Eating better, had 1/2 cheeseburger and other solid food. stooling diarrhea now but overall improved. I was going to call in periactin to tide her over with the nausea and cramps but now we will defer medicine and plan to manage constipation as a long-term issue this coming Monday.  Angela Bowman Stable

## 2018-02-07 LAB
CA-I SERPL-SCNC: 1.2 MMOL/L — SIGNIFICANT CHANGE UP (ref 1.12–1.3)
GAS PNL BLDV: 135 MMOL/L — LOW (ref 136–145)
GAS PNL BLDV: SIGNIFICANT CHANGE UP
HAV IGM SER-ACNC: SIGNIFICANT CHANGE UP
HBV CORE AB SER-ACNC: SIGNIFICANT CHANGE UP
HBV CORE IGM SER-ACNC: SIGNIFICANT CHANGE UP
HBV SURFACE AB SER-ACNC: <3 MIU/ML — LOW
HBV SURFACE AG SER-ACNC: SIGNIFICANT CHANGE UP
HBV SURFACE AG SER-ACNC: SIGNIFICANT CHANGE UP
HCO3 BLDV-SCNC: 24 MMOL/L — SIGNIFICANT CHANGE UP (ref 22–29)
HCV AB S/CO SERPL IA: 0.13 S/CO — SIGNIFICANT CHANGE UP
HCV AB SERPL-IMP: SIGNIFICANT CHANGE UP
LACTATE BLDV-MCNC: 1 MMOL/L — SIGNIFICANT CHANGE UP (ref 0.5–1.6)
PCO2 BLDV: 34 MMHG — LOW (ref 41–51)
PH BLDV: 7.45 — HIGH (ref 7.26–7.43)
PO2 BLDV: 75 MMHG — HIGH (ref 20–40)
POTASSIUM BLDV-SCNC: 4 MMOL/L — SIGNIFICANT CHANGE UP (ref 3.3–5.6)
SAO2 % BLDV: 96 % — SIGNIFICANT CHANGE UP
T PALLIDUM AB TITR SER: NEGATIVE — SIGNIFICANT CHANGE UP

## 2018-03-26 ENCOUNTER — EMERGENCY (EMERGENCY)
Facility: HOSPITAL | Age: 63
LOS: 0 days | Discharge: LEFT BEFORE DISCHARGE | End: 2018-03-27
Attending: EMERGENCY MEDICINE

## 2018-03-26 VITALS
OXYGEN SATURATION: 95 % | HEART RATE: 103 BPM | RESPIRATION RATE: 18 BRPM | TEMPERATURE: 100 F | SYSTOLIC BLOOD PRESSURE: 116 MMHG | DIASTOLIC BLOOD PRESSURE: 63 MMHG

## 2018-03-26 DIAGNOSIS — Z88.1 ALLERGY STATUS TO OTHER ANTIBIOTIC AGENTS STATUS: ICD-10-CM

## 2018-03-26 DIAGNOSIS — E11.9 TYPE 2 DIABETES MELLITUS WITHOUT COMPLICATIONS: ICD-10-CM

## 2018-03-26 DIAGNOSIS — R41.82 ALTERED MENTAL STATUS, UNSPECIFIED: ICD-10-CM

## 2018-03-26 DIAGNOSIS — E87.1 HYPO-OSMOLALITY AND HYPONATREMIA: ICD-10-CM

## 2018-03-26 DIAGNOSIS — Z79.83 LONG TERM (CURRENT) USE OF BISPHOSPHONATES: ICD-10-CM

## 2018-03-26 DIAGNOSIS — Z88.2 ALLERGY STATUS TO SULFONAMIDES: ICD-10-CM

## 2018-03-26 DIAGNOSIS — Z79.84 LONG TERM (CURRENT) USE OF ORAL HYPOGLYCEMIC DRUGS: ICD-10-CM

## 2018-03-26 NOTE — ED ADULT TRIAGE NOTE - CHIEF COMPLAINT QUOTE
[facility], his mother would not let him in the apartment, he was in the hallway and the doorman called 911 - EMS

## 2018-03-27 VITALS
RESPIRATION RATE: 18 BRPM | OXYGEN SATURATION: 96 % | TEMPERATURE: 98 F | DIASTOLIC BLOOD PRESSURE: 76 MMHG | HEART RATE: 99 BPM | SYSTOLIC BLOOD PRESSURE: 122 MMHG

## 2018-03-27 DIAGNOSIS — F10.10 ALCOHOL ABUSE, UNCOMPLICATED: ICD-10-CM

## 2018-03-27 LAB
ALBUMIN SERPL ELPH-MCNC: 4 G/DL — SIGNIFICANT CHANGE UP (ref 3.5–5.2)
ALP SERPL-CCNC: 80 U/L — SIGNIFICANT CHANGE UP (ref 30–115)
ALT FLD-CCNC: 11 U/L — SIGNIFICANT CHANGE UP (ref 0–41)
AMPHET UR-MCNC: NEGATIVE — SIGNIFICANT CHANGE UP
ANION GAP SERPL CALC-SCNC: 13 MMOL/L — SIGNIFICANT CHANGE UP (ref 7–14)
APAP SERPL-MCNC: <5 UG/ML — LOW (ref 10–30)
AST SERPL-CCNC: 20 U/L — SIGNIFICANT CHANGE UP (ref 0–41)
BARBITURATES UR SCN-MCNC: NEGATIVE — SIGNIFICANT CHANGE UP
BASOPHILS # BLD AUTO: 0.02 K/UL — SIGNIFICANT CHANGE UP (ref 0–0.2)
BASOPHILS NFR BLD AUTO: 0.4 % — SIGNIFICANT CHANGE UP (ref 0–1)
BENZODIAZ UR-MCNC: POSITIVE
BILIRUB SERPL-MCNC: <0.2 MG/DL — SIGNIFICANT CHANGE UP (ref 0.2–1.2)
BUN SERPL-MCNC: 10 MG/DL — SIGNIFICANT CHANGE UP (ref 10–20)
CALCIUM SERPL-MCNC: 8.5 MG/DL — SIGNIFICANT CHANGE UP (ref 8.5–10.1)
CHLORIDE SERPL-SCNC: 91 MMOL/L — LOW (ref 98–110)
CO2 SERPL-SCNC: 22 MMOL/L — SIGNIFICANT CHANGE UP (ref 17–32)
COCAINE METAB.OTHER UR-MCNC: NEGATIVE — SIGNIFICANT CHANGE UP
CREAT SERPL-MCNC: 1.3 MG/DL — SIGNIFICANT CHANGE UP (ref 0.7–1.5)
EOSINOPHIL # BLD AUTO: 0.03 K/UL — SIGNIFICANT CHANGE UP (ref 0–0.7)
EOSINOPHIL NFR BLD AUTO: 0.6 % — SIGNIFICANT CHANGE UP (ref 0–8)
ETHANOL SERPL-MCNC: <10 MG/DL — HIGH
GLUCOSE SERPL-MCNC: 264 MG/DL — HIGH (ref 70–99)
HCT VFR BLD CALC: 35 % — LOW (ref 42–52)
HGB BLD-MCNC: 11.3 G/DL — LOW (ref 14–18)
IMM GRANULOCYTES NFR BLD AUTO: 0.4 % — HIGH (ref 0.1–0.3)
LYMPHOCYTES # BLD AUTO: 2.06 K/UL — SIGNIFICANT CHANGE UP (ref 1.2–3.4)
LYMPHOCYTES # BLD AUTO: 38.7 % — SIGNIFICANT CHANGE UP (ref 20.5–51.1)
MCHC RBC-ENTMCNC: 23.4 PG — LOW (ref 27–31)
MCHC RBC-ENTMCNC: 32.3 G/DL — SIGNIFICANT CHANGE UP (ref 32–37)
MCV RBC AUTO: 72.5 FL — LOW (ref 80–94)
METHADONE UR-MCNC: NEGATIVE — SIGNIFICANT CHANGE UP
MONOCYTES # BLD AUTO: 0.64 K/UL — HIGH (ref 0.1–0.6)
MONOCYTES NFR BLD AUTO: 12 % — HIGH (ref 1.7–9.3)
NEUTROPHILS # BLD AUTO: 2.55 K/UL — SIGNIFICANT CHANGE UP (ref 1.4–6.5)
NEUTROPHILS NFR BLD AUTO: 47.9 % — SIGNIFICANT CHANGE UP (ref 42.2–75.2)
NRBC # BLD: 0 /100 WBCS — SIGNIFICANT CHANGE UP (ref 0–0)
OPIATES UR-MCNC: NEGATIVE — SIGNIFICANT CHANGE UP
PCP SPEC-MCNC: SIGNIFICANT CHANGE UP
PLATELET # BLD AUTO: 216 K/UL — SIGNIFICANT CHANGE UP (ref 130–400)
POTASSIUM SERPL-MCNC: 4.1 MMOL/L — SIGNIFICANT CHANGE UP (ref 3.5–5)
POTASSIUM SERPL-SCNC: 4.1 MMOL/L — SIGNIFICANT CHANGE UP (ref 3.5–5)
PROPOXYPHENE QUALITATIVE URINE RESULT: NEGATIVE — SIGNIFICANT CHANGE UP
PROT SERPL-MCNC: 6.7 G/DL — SIGNIFICANT CHANGE UP (ref 6–8)
RBC # BLD: 4.83 M/UL — SIGNIFICANT CHANGE UP (ref 4.7–6.1)
RBC # FLD: 19.7 % — HIGH (ref 11.5–14.5)
SALICYLATES SERPL-MCNC: <0.3 MG/DL — LOW (ref 4–30)
SODIUM SERPL-SCNC: 126 MMOL/L — LOW (ref 135–146)
WBC # BLD: 5.32 K/UL — SIGNIFICANT CHANGE UP (ref 4.8–10.8)
WBC # FLD AUTO: 5.32 K/UL — SIGNIFICANT CHANGE UP (ref 4.8–10.8)

## 2018-03-27 RX ORDER — SODIUM CHLORIDE 9 MG/ML
1000 INJECTION INTRAMUSCULAR; INTRAVENOUS; SUBCUTANEOUS ONCE
Qty: 0 | Refills: 0 | Status: DISCONTINUED | OUTPATIENT
Start: 2018-03-27 | End: 2018-03-27

## 2018-03-27 NOTE — ED ADULT NURSE REASSESSMENT NOTE - NS ED NURSE REASSESS COMMENT FT1
Patient eloped, walked outside of ED towards bus stop. Requested patient to come back inside and wait for discharge, patient refuses, states he is waiting for bus to go home. No IV in place. MD aware.

## 2018-03-27 NOTE — ED BEHAVIORAL HEALTH ASSESSMENT NOTE - HPI (INCLUDE ILLNESS QUALITY, SEVERITY, DURATION, TIMING, CONTEXT, MODIFYING FACTORS, ASSOCIATED SIGNS AND SYMPTOMS)
61 y/o male known to me from multiple prior IPP admissions; known to ED staff for frequent evals, last in feb when he claimed to have drank rat poison.  Pt was just d/c'd yesterday from [facility] detox, and apparently resumed drinking.  He was then making a scene at his mothers, and per chart the doorman called 911. Pt did not express any s/h ideation at that time, nor is he expressing any now. Pt is minimally cooperative, and appears clinically intoxicated.  Attempted to call mother but no answer at [phone] 61 y/o male known to me from multiple prior IPP admissions; known to ED staff for frequent evals, last in feb when he claimed to have ingested rat poison.  Pt was just d/c'd yesterday from [facility] detox, and apparently resumed drinking.  He was then making a scene at his mothers, and per chart the doorman called 911. Pt did not express any s/h ideation at that time, nor is he expressing any now. Pt is minimally cooperative, and appeared ? intoxicated.  Attempted to call mother but no answer at [phone]. 63 y/o male known to me from multiple prior IPP admissions; known to ED staff for frequent evals, last in feb when he claimed to have ingested rat poison.  Pt was just d/c'd yesterday from [facility] detox, and apparently resumed drinking; states he had "some".  He was then making a scene at his mothers, and per chart the doorman called 911. Pt did not express any s/h ideation at that time, nor is he expressing any now. Pt is minimally cooperative, and appeared ? intoxicated.  Attempted to call mother but no answer at [phone].

## 2018-03-27 NOTE — ED ADULT NURSE REASSESSMENT NOTE - NS ED NURSE REASSESS COMMENT FT1
Oatient is alert,non-oriented X 3 ,patient stated"i don't know where I am ". Called the docter  ,agreed to come and see him.

## 2018-03-27 NOTE — ED BEHAVIORAL HEALTH ASSESSMENT NOTE - DESCRIPTION
refuses to discuss domiciled/unemployed; PTSD dx? pt sleeping pt sleeping prior to my arrival. Was irritable during interview, minimally cooperative

## 2018-03-27 NOTE — ED ADULT NURSE NOTE - NS ED NURSE ELOPE COMMENTS
Patient eloped, walked outside to bus stop without notifying anyone. Requested that patient come back inside hospital, patient refused. Dr. Sanchez aware. No IV in place.

## 2018-03-27 NOTE — ED BEHAVIORAL HEALTH ASSESSMENT NOTE - REFERRAL / APPOINTMENT DETAILS
pt was initially held pending labs. Pt not intoxicated with etoh; ED aware of borderline low sodium. He started to bang on computer with his cane at one point pending re-eval and labs. Pt given referral phone number for dual focus outpt dept

## 2018-03-27 NOTE — ED BEHAVIORAL HEALTH ASSESSMENT NOTE - SUMMARY
s/p intox after getting out of detox;  agitation in community leading to 911 call and transport to ED s/p intox after getting out of detox;  agitation in community leading to 911 call and transport to ED.  No acute suicidal or homicidal ideation.  No overt psychosis

## 2018-03-27 NOTE — ED PROVIDER NOTE - PROGRESS NOTE DETAILS
s/w Psych Dr. Tian, will come assess pt in ED per Dr. Tian pt intox, requesting Psych labs incl BAL, will reassess pt once clinically sober pt has been ambulating steadily around ED, EtOH neg, became agitated and started smashing a portable metal computer station with his cane - security called to bedside, pt now sitting calmly - d/w Psych Dr. Tian, will come assess again now in ED per Dr. Tian pt ok for discharge to home - pt made aware of hyponatremia, advised close f/u with PMD as outpt called to bedside by RN who states pt is confused and thinks he's in [place] - pt reassessed by me, recounted story of being discharge from [facility] Psych yest and his mother not letting him in the apartment bc she is afraid of him - states he does not know where else he will go if discharged - is aaox2 (person, place - states he is in  hospital), but not oriented to time, was unsure of day time - asked if any recent head trauma, states he fell twice while admitted to [facility], no head imaging, no signs of head trauma on exam - given confusion, hyponatremia and reported fall will order CT head, NS, possible admission for AMS s/o to me. 63 y/o male with hx of bipolar d/o and PTSD. Recently discharged from in-patient psych for PTSD. Went home but mother will not allow him in the house. Brought in by PD. On my assessment, pt is awake and alert and oriented x 3. He has no focal neurologic deficits. His V/S are WNL. He was seen by psychiatry and cleared for discharge. Will consult social work regarding D/C planning. Pt left the ED prior to social work assessment.

## 2018-03-27 NOTE — ED PROVIDER NOTE - CARE PLAN
Principal Discharge DX:	Behavior concern in adult  Secondary Diagnosis:	Hyponatremia Principal Discharge DX:	Altered mental status  Secondary Diagnosis:	Hyponatremia  Secondary Diagnosis:	Behavior concern in adult

## 2018-03-27 NOTE — ED PROVIDER NOTE - OBJECTIVE STATEMENT
61yo m h/o depression/PTSD on prozac & trazadone, dn, polysub incl etoh abuse presenting for evaluation. Pt states he was discharged from [facility] hospital today after IPP stay for PTSD. Went to his mother's apartment with whom she lives, but she would not let him in bc pt states she is afraid of him. [employer] was called, then EMS who per [facility] arrived on seen and assessed that pt was intox, pt admitted to drinking earlier this evening. Pt denies S/I, H/I, AVH. No other complaints. Denies any illicit drug use. No recent illnesses. 61yo m h/o depression/PTSD on prozac & trazadone, dm, polysub incl etoh abuse presenting for evaluation. Pt states he was discharged from [facility] hospital today after IPP stay for PTSD. Went to his mother's apartment with whom she lives, but she would not let him in bc pt states she is afraid of him. [employer] was called, then EMS who per ACR arrived on seen and assessed that pt was intox, pt admitted to drinking earlier this evening. Pt denies S/I, H/I, AVH. No other complaints. Denies any illicit drug use. No recent illnesses. 61yo m h/o depression/PTSD on prozac & trazadone, dm, polysub incl etoh abuse presenting for evaluation. Pt states he was discharged from [facility] hospital today after IPP stay for PTSD. Went to his mother's apartment with whom he lives, but she would not let him in bc pt states she is afraid of him. [employer] was called, then EMS who per A[facility] arrived on seen and assessed that pt was intox, with pt admitting to drinking earlier this evening, brought to ED for further eval. Pt denies S/I, H/I, AVH. No other complaints. Denies any illicit drug use. No recent illnesses.

## 2018-03-27 NOTE — ED BEHAVIORAL HEALTH ASSESSMENT NOTE - DETAILS
- broderick JAIME for detox irritable/oppositiona pt has frequently expressed suicidal ideation/threats, at times in context of attempting to gain psych admission irritable/oppositional

## 2018-03-27 NOTE — ED PROVIDER NOTE - PHYSICAL EXAMINATION
VITAL SIGNS: I have reviewed nursing notes and confirm.  CONSTITUTIONAL: middle-aged m, unkempt, nad  SKIN: Skin exam is warm and dry, no acute rash.  HEAD: Normocephalic; atraumatic.  EYES: PERRL, EOM intact; conjunctiva and sclera clear.  ENT: No nasal discharge; airway clear.  NECK: Supple; non tender.  CARD: S1, S2 normal; no murmurs, gallops, or rubs. Regular rate and rhythm.  RESP: No wheezes, rales or rhonchi.  ABD: Normal bowel sounds; soft; non-distended; non-tender; no hepatosplenomegaly.  EXT: Normal ROM. No clubbing, cyanosis or edema. dpi.  NEURO: Alert, oriented. Grossly unremarkable. No focal deficits.  PSYCH: Cooperative, appropriate.

## 2018-03-27 NOTE — ED PROVIDER NOTE - NS ED ROS FT
Constitutional: No fever, chills, unintended weight loss.  Eyes:  No visual changes, eye pain or discharge.  ENMT:  No hearing changes, pain, no sore throat or runny nose, no difficulty swallowing  Cardiac:  No chest pain, SOB or edema. No chest pain with exertion.  Respiratory:  No cough or respiratory distress. No hemoptysis. No history of asthma or RAD.  GI:  No nausea, vomiting, diarrhea or abdominal pain.  :  No dysuria, frequency or burning.  MS:  No myalgia, muscle weakness, joint pain or back pain.  Neuro:  No headache or weakness.  No LOC.  Skin:  No skin rash.   Endocrine: No history of thyroid disease; +dm

## 2018-04-09 ENCOUNTER — EMERGENCY (EMERGENCY)
Facility: HOSPITAL | Age: 63
LOS: 0 days | Discharge: LEFT BEFORE DISCHARGE | End: 2018-04-09

## 2018-04-09 ENCOUNTER — EMERGENCY (EMERGENCY)
Facility: HOSPITAL | Age: 63
LOS: 0 days | Discharge: HOME | End: 2018-04-10
Attending: EMERGENCY MEDICINE

## 2018-04-09 VITALS
RESPIRATION RATE: 18 BRPM | DIASTOLIC BLOOD PRESSURE: 87 MMHG | TEMPERATURE: 98 F | OXYGEN SATURATION: 97 % | SYSTOLIC BLOOD PRESSURE: 156 MMHG | HEART RATE: 90 BPM

## 2018-04-09 VITALS
OXYGEN SATURATION: 98 % | TEMPERATURE: 97 F | HEART RATE: 78 BPM | SYSTOLIC BLOOD PRESSURE: 145 MMHG | RESPIRATION RATE: 18 BRPM | DIASTOLIC BLOOD PRESSURE: 87 MMHG

## 2018-04-09 DIAGNOSIS — F10.129 ALCOHOL ABUSE WITH INTOXICATION, UNSPECIFIED: ICD-10-CM

## 2018-04-09 DIAGNOSIS — F31.9 BIPOLAR DISORDER, UNSPECIFIED: ICD-10-CM

## 2018-04-09 DIAGNOSIS — Z88.1 ALLERGY STATUS TO OTHER ANTIBIOTIC AGENTS STATUS: ICD-10-CM

## 2018-04-09 DIAGNOSIS — Z53.21 PROCEDURE AND TREATMENT NOT CARRIED OUT DUE TO PATIENT LEAVING PRIOR TO BEING SEEN BY HEALTH CARE PROVIDER: ICD-10-CM

## 2018-04-09 DIAGNOSIS — Z88.2 ALLERGY STATUS TO SULFONAMIDES: ICD-10-CM

## 2018-04-09 NOTE — ED ADULT TRIAGE NOTE - CHIEF COMPLAINT QUOTE
smoking cocaine requesting detox , refused to be evaluated, ambulatory steady gait , no acute s/s of intoxication noted.

## 2018-04-10 VITALS
DIASTOLIC BLOOD PRESSURE: 70 MMHG | RESPIRATION RATE: 18 BRPM | HEART RATE: 104 BPM | OXYGEN SATURATION: 100 % | SYSTOLIC BLOOD PRESSURE: 157 MMHG

## 2018-04-10 LAB
ALBUMIN SERPL ELPH-MCNC: 4.2 G/DL — SIGNIFICANT CHANGE UP (ref 3.5–5.2)
ALP SERPL-CCNC: 87 U/L — SIGNIFICANT CHANGE UP (ref 30–115)
ALT FLD-CCNC: 21 U/L — SIGNIFICANT CHANGE UP (ref 0–41)
ANION GAP SERPL CALC-SCNC: 17 MMOL/L — HIGH (ref 7–14)
AST SERPL-CCNC: 95 U/L — HIGH (ref 0–41)
BASOPHILS # BLD AUTO: 0.03 K/UL — SIGNIFICANT CHANGE UP (ref 0–0.2)
BASOPHILS NFR BLD AUTO: 0.3 % — SIGNIFICANT CHANGE UP (ref 0–1)
BILIRUB SERPL-MCNC: 0.5 MG/DL — SIGNIFICANT CHANGE UP (ref 0.2–1.2)
BUN SERPL-MCNC: 8 MG/DL — LOW (ref 10–20)
CALCIUM SERPL-MCNC: 8.9 MG/DL — SIGNIFICANT CHANGE UP (ref 8.5–10.1)
CHLORIDE SERPL-SCNC: 90 MMOL/L — LOW (ref 98–110)
CO2 SERPL-SCNC: 24 MMOL/L — SIGNIFICANT CHANGE UP (ref 17–32)
CREAT SERPL-MCNC: 1 MG/DL — SIGNIFICANT CHANGE UP (ref 0.7–1.5)
EOSINOPHIL # BLD AUTO: 0.02 K/UL — SIGNIFICANT CHANGE UP (ref 0–0.7)
EOSINOPHIL NFR BLD AUTO: 0.2 % — SIGNIFICANT CHANGE UP (ref 0–8)
ETHANOL SERPL-MCNC: <10 MG/DL — HIGH
GLUCOSE SERPL-MCNC: 311 MG/DL — HIGH (ref 70–99)
HCT VFR BLD CALC: 44.3 % — SIGNIFICANT CHANGE UP (ref 42–52)
HGB BLD-MCNC: 13.9 G/DL — LOW (ref 14–18)
IMM GRANULOCYTES NFR BLD AUTO: 0.4 % — HIGH (ref 0.1–0.3)
LIDOCAIN IGE QN: 27 U/L — SIGNIFICANT CHANGE UP (ref 7–60)
LYMPHOCYTES # BLD AUTO: 1.39 K/UL — SIGNIFICANT CHANGE UP (ref 1.2–3.4)
LYMPHOCYTES # BLD AUTO: 13.7 % — LOW (ref 20.5–51.1)
MCHC RBC-ENTMCNC: 23.4 PG — LOW (ref 27–31)
MCHC RBC-ENTMCNC: 31.4 G/DL — LOW (ref 32–37)
MCV RBC AUTO: 74.7 FL — LOW (ref 80–94)
MONOCYTES # BLD AUTO: 0.57 K/UL — SIGNIFICANT CHANGE UP (ref 0.1–0.6)
MONOCYTES NFR BLD AUTO: 5.6 % — SIGNIFICANT CHANGE UP (ref 1.7–9.3)
NEUTROPHILS # BLD AUTO: 8.08 K/UL — HIGH (ref 1.4–6.5)
NEUTROPHILS NFR BLD AUTO: 79.8 % — HIGH (ref 42.2–75.2)
NRBC # BLD: 0 /100 WBCS — SIGNIFICANT CHANGE UP (ref 0–0)
PLATELET # BLD AUTO: 397 K/UL — SIGNIFICANT CHANGE UP (ref 130–400)
POTASSIUM SERPL-MCNC: >10 MMOL/L — CRITICAL HIGH (ref 3.5–5)
POTASSIUM SERPL-SCNC: >10 MMOL/L — CRITICAL HIGH (ref 3.5–5)
PROT SERPL-MCNC: 8.1 G/DL — HIGH (ref 6–8)
RBC # BLD: 5.93 M/UL — SIGNIFICANT CHANGE UP (ref 4.7–6.1)
RBC # FLD: 23.1 % — HIGH (ref 11.5–14.5)
SODIUM SERPL-SCNC: 131 MMOL/L — LOW (ref 135–146)
WBC # BLD: 10.13 K/UL — SIGNIFICANT CHANGE UP (ref 4.8–10.8)
WBC # FLD AUTO: 10.13 K/UL — SIGNIFICANT CHANGE UP (ref 4.8–10.8)

## 2018-04-10 NOTE — ED PROVIDER NOTE - PHYSICAL EXAMINATION
VITAL SIGNS: I have reviewed nursing notes and confirm.  CONSTITUTIONAL: Well-developed; no distress  SKIN: Skin exam is warm and dry, no acute rash, no signs of trauma.  HEAD: Normocephalic; atraumatic  EYES: PERRL, EOM intact; conjunctiva and sclera clear.  ENT: No nasal discharge; airway clear. TMs clear.  CARD:  Regular rate and rhythm.  RESP: No wheezes, rales or rhonchi.  ABD: Normal bowel sounds; soft; non-distended; non-tender  EXT: Normal ROM appears intact from spontaneous movements  NEURO: asleep but easy to awaken, follows some commands, moving all limbs  PSYCH: not cooperative at all, based on previous chart review patient has extensive pysch history, polysubstance abuse

## 2018-04-10 NOTE — ED PROVIDER NOTE - PROGRESS NOTE DETAILS
Patient observed sticking his fingers down his throat to induce vomiting -- unclear what, if any, secondary gain he has but he remains a poor historian with a complicated history. WIll get head CT, check labs and reassess. Labs reviewed, of note K is grossly hemolyzed, has normal Cr -- no reason to have elevated K. Etoh is negative. patient is awake and alert, he refuses to walk, when attempt to help him walk he became physically and verbally threatening. He is not acutely psychotic, he reports "I used drugs and need to sleep."     He has been observed, has unremarkable CT and demonstrates goal directed behavior. He has no indication for admisison at this time and can be discharged with outpatient psych eval.

## 2018-04-10 NOTE — ED PROVIDER NOTE - OBJECTIVE STATEMENT
63 yo M BIBEMS, he reportedly called 911 saying he was intoxicated. Patient was previously here stating he used crack and wanted detox but he walked out. He appears intoxicated but is non compliant with history and exam, no nystagmus, refuses to ambulate, is physically threatening to me, raising his hands, when I attempt to prompt him to further answer questions.

## 2018-08-03 ENCOUNTER — EMERGENCY (EMERGENCY)
Facility: HOSPITAL | Age: 63
LOS: 0 days | Discharge: HOME | End: 2018-08-03
Attending: EMERGENCY MEDICINE | Admitting: EMERGENCY MEDICINE

## 2018-08-03 VITALS
HEART RATE: 96 BPM | SYSTOLIC BLOOD PRESSURE: 106 MMHG | DIASTOLIC BLOOD PRESSURE: 57 MMHG | TEMPERATURE: 97 F | RESPIRATION RATE: 18 BRPM | OXYGEN SATURATION: 99 %

## 2018-08-03 DIAGNOSIS — Z88.2 ALLERGY STATUS TO SULFONAMIDES: ICD-10-CM

## 2018-08-03 DIAGNOSIS — Z88.1 ALLERGY STATUS TO OTHER ANTIBIOTIC AGENTS STATUS: ICD-10-CM

## 2018-08-03 DIAGNOSIS — F10.129 ALCOHOL ABUSE WITH INTOXICATION, UNSPECIFIED: ICD-10-CM

## 2018-08-03 NOTE — ED PROVIDER NOTE - MEDICAL DECISION MAKING DETAILS
Patient seen and examined. Ambulating in ED. Clinically sober. No complaints. Refused FS. Patient discharged from ED ins table condition.

## 2018-08-03 NOTE — ED PROVIDER NOTE - ATTENDING CONTRIBUTION TO CARE
Patient presents with alcohol intoxication.  No signs of trauma and patient denies any other pain or issues.  No HI or SI.    Ethanol intoxication - no evidence of injury or head trauma, no abd pain or vomiting.  Plan:  1) Will continue to monitor airway and mental status, check Fingerstick.  Will allow patient to metabolize.  Will pursue further work up if patients AMS does not improve as expected with alcohol intoxication.  2) To discharge, will reassess/po challenge/and ambulate when patient is clinically sober to ensure safe discharge.

## 2018-08-03 NOTE — ED PROVIDER NOTE - OBJECTIVE STATEMENT
Hx very limited secondary to alcohol intoxication.   61 yo M with hx of alcohol abuse who was BIBA for alcohol intoxication. Pt knows he was BIBA. Denies falls, trauma, HI, SI. No complaints at this time.

## 2018-08-03 NOTE — ED ADULT NURSE REASSESSMENT NOTE - NS ED NURSE REASSESS COMMENT FT1
Patient refusing FS. Patient is AOx 4 following verbal commands, ambulating steady with no assistance. Patient took food and drink to go.

## 2018-08-03 NOTE — ED ADULT NURSE NOTE - OBJECTIVE STATEMENT
report coming to the ED for heart problems. not answering questions during assessment. report coming to the ED for heart problems. not answering questions during assessment.  brought in by ems for etoh intoxication, denies any fall

## 2018-08-03 NOTE — ED ADULT NURSE NOTE - NSIMPLEMENTINTERV_GEN_ALL_ED
Implemented All Fall with Harm Risk Interventions:  [place] to call system. Call bell, personal items and telephone within reach. Instruct patient to call for assistance. Room bathroom lighting operational. Non-slip footwear when patient is off stretcher. Physically safe environment: no spills, clutter or unnecessary equipment. Stretcher in lowest position, wheels locked, appropriate side rails in place. Provide visual cue, wrist band, yellow gown, etc. Monitor gait and stability. Monitor for mental status changes and reorient to person, place, and time. Review medications for side effects contributing to fall risk. Reinforce activity limits and safety measures with patient and family. Provide visual clues: red socks.

## 2018-08-03 NOTE — ED PROVIDER NOTE - PHYSICAL EXAMINATION
CONSTITUTIONAL: well developed, nontoxic appearing, in no acute distress  SKIN: warm, dry, no rash  HEENT: normocephalic, atraumatic, no conjunctival erythema, moist mucous membranes, patent airway  NECK: supple, no masses  CV:  regular rate, regular rhythm  RESP: normal work of breathing  ABD: soft, nontender, nondistended  MSK: normal ROM, no cyanosis, no edema  NEURO: sleeping but arousable, grossly unremarkable  PSYCH: cooperative, appropriate

## 2018-08-14 NOTE — ED ADULT NURSE NOTE - NS ED NURSE LEVEL OF CONSCIOUSNESS AFFECT
Pt visited the eye doctor yesterday and was found to have \"bleeding in the back of his eyes.\"  He is seeing a retinal specialist tomorrow    He would like to have fasting labs drawn.  He will be losing his insurance 8/31/18, and will be getting new insurance in about 60 days after that.    Lab orders entered. Pt will come in tomorrow to have these drawn.       Calm

## 2018-08-19 ENCOUNTER — INPATIENT (INPATIENT)
Facility: HOSPITAL | Age: 63
LOS: 4 days | Discharge: LEFT BEFORE DISCHARGE | End: 2018-08-24
Attending: INTERNAL MEDICINE | Admitting: INTERNAL MEDICINE

## 2018-08-19 VITALS
TEMPERATURE: 97 F | HEART RATE: 90 BPM | RESPIRATION RATE: 18 BRPM | DIASTOLIC BLOOD PRESSURE: 80 MMHG | SYSTOLIC BLOOD PRESSURE: 130 MMHG | OXYGEN SATURATION: 99 %

## 2018-08-19 DIAGNOSIS — F10.10 ALCOHOL ABUSE, UNCOMPLICATED: ICD-10-CM

## 2018-08-19 DIAGNOSIS — F11.10 OPIOID ABUSE, UNCOMPLICATED: ICD-10-CM

## 2018-08-19 LAB
ALBUMIN SERPL ELPH-MCNC: 4 G/DL — SIGNIFICANT CHANGE UP (ref 3.5–5.2)
ALP SERPL-CCNC: 100 U/L — SIGNIFICANT CHANGE UP (ref 30–115)
ALT FLD-CCNC: 12 U/L — SIGNIFICANT CHANGE UP (ref 0–41)
ANION GAP SERPL CALC-SCNC: 14 MMOL/L — SIGNIFICANT CHANGE UP (ref 7–14)
APAP SERPL-MCNC: <5 UG/ML — LOW (ref 10–30)
APTT BLD: 35.2 SEC — SIGNIFICANT CHANGE UP (ref 27–39.2)
AST SERPL-CCNC: 20 U/L — SIGNIFICANT CHANGE UP (ref 0–41)
BASOPHILS # BLD AUTO: 0.06 K/UL — SIGNIFICANT CHANGE UP (ref 0–0.2)
BASOPHILS NFR BLD AUTO: 1 % — SIGNIFICANT CHANGE UP (ref 0–1)
BILIRUB SERPL-MCNC: 0.3 MG/DL — SIGNIFICANT CHANGE UP (ref 0.2–1.2)
BUN SERPL-MCNC: 7 MG/DL — LOW (ref 10–20)
CALCIUM SERPL-MCNC: 9.1 MG/DL — SIGNIFICANT CHANGE UP (ref 8.5–10.1)
CHLORIDE SERPL-SCNC: 104 MMOL/L — SIGNIFICANT CHANGE UP (ref 98–110)
CO2 SERPL-SCNC: 22 MMOL/L — SIGNIFICANT CHANGE UP (ref 17–32)
CREAT SERPL-MCNC: 1 MG/DL — SIGNIFICANT CHANGE UP (ref 0.7–1.5)
EOSINOPHIL # BLD AUTO: 0.11 K/UL — SIGNIFICANT CHANGE UP (ref 0–0.7)
EOSINOPHIL NFR BLD AUTO: 1.9 % — SIGNIFICANT CHANGE UP (ref 0–8)
ETHANOL SERPL-MCNC: <10 MG/DL — HIGH
GLUCOSE SERPL-MCNC: 143 MG/DL — HIGH (ref 70–99)
HCT VFR BLD CALC: 43.3 % — SIGNIFICANT CHANGE UP (ref 42–52)
HGB BLD-MCNC: 13.9 G/DL — LOW (ref 14–18)
HIV 1 & 2 AB SERPL IA.RAPID: SIGNIFICANT CHANGE UP
IMM GRANULOCYTES NFR BLD AUTO: 0.3 % — SIGNIFICANT CHANGE UP (ref 0.1–0.3)
INR BLD: 0.96 RATIO — SIGNIFICANT CHANGE UP (ref 0.65–1.3)
LACTATE SERPL-SCNC: 1.5 MMOL/L — SIGNIFICANT CHANGE UP (ref 0.5–2.2)
LIDOCAIN IGE QN: 18 U/L — SIGNIFICANT CHANGE UP (ref 7–60)
LYMPHOCYTES # BLD AUTO: 2.16 K/UL — SIGNIFICANT CHANGE UP (ref 1.2–3.4)
LYMPHOCYTES # BLD AUTO: 37.3 % — SIGNIFICANT CHANGE UP (ref 20.5–51.1)
MAGNESIUM SERPL-MCNC: 1.9 MG/DL — SIGNIFICANT CHANGE UP (ref 1.8–2.4)
MCHC RBC-ENTMCNC: 24.7 PG — LOW (ref 27–31)
MCHC RBC-ENTMCNC: 32.1 G/DL — SIGNIFICANT CHANGE UP (ref 32–37)
MCV RBC AUTO: 76.9 FL — LOW (ref 80–94)
MONOCYTES # BLD AUTO: 0.68 K/UL — HIGH (ref 0.1–0.6)
MONOCYTES NFR BLD AUTO: 11.7 % — HIGH (ref 1.7–9.3)
NEUTROPHILS # BLD AUTO: 2.76 K/UL — SIGNIFICANT CHANGE UP (ref 1.4–6.5)
NEUTROPHILS NFR BLD AUTO: 47.8 % — SIGNIFICANT CHANGE UP (ref 42.2–75.2)
NRBC # BLD: 0 /100 WBCS — SIGNIFICANT CHANGE UP (ref 0–0)
PLATELET # BLD AUTO: 318 K/UL — SIGNIFICANT CHANGE UP (ref 130–400)
POTASSIUM SERPL-MCNC: 4.7 MMOL/L — SIGNIFICANT CHANGE UP (ref 3.5–5)
POTASSIUM SERPL-SCNC: 4.7 MMOL/L — SIGNIFICANT CHANGE UP (ref 3.5–5)
PROT SERPL-MCNC: 6.9 G/DL — SIGNIFICANT CHANGE UP (ref 6–8)
PROTHROM AB SERPL-ACNC: 10.4 SEC — SIGNIFICANT CHANGE UP (ref 9.95–12.87)
RBC # BLD: 5.63 M/UL — SIGNIFICANT CHANGE UP (ref 4.7–6.1)
RBC # FLD: 19.4 % — HIGH (ref 11.5–14.5)
SALICYLATES SERPL-MCNC: <0.3 MG/DL — LOW (ref 4–30)
SODIUM SERPL-SCNC: 140 MMOL/L — SIGNIFICANT CHANGE UP (ref 135–146)
WBC # BLD: 5.79 K/UL — SIGNIFICANT CHANGE UP (ref 4.8–10.8)
WBC # FLD AUTO: 5.79 K/UL — SIGNIFICANT CHANGE UP (ref 4.8–10.8)

## 2018-08-19 RX ORDER — METHADONE HYDROCHLORIDE 40 MG/1
TABLET ORAL
Qty: 0 | Refills: 0 | Status: COMPLETED | OUTPATIENT
Start: 2018-08-19 | End: 2018-08-22

## 2018-08-19 RX ORDER — METHADONE HYDROCHLORIDE 40 MG/1
5 TABLET ORAL EVERY 12 HOURS
Qty: 0 | Refills: 0 | Status: DISCONTINUED | OUTPATIENT
Start: 2018-08-20 | End: 2018-08-22

## 2018-08-19 RX ORDER — HYDROXYZINE HCL 10 MG
100 TABLET ORAL AT BEDTIME
Qty: 0 | Refills: 0 | Status: DISCONTINUED | OUTPATIENT
Start: 2018-08-19 | End: 2018-08-23

## 2018-08-19 RX ORDER — METHADONE HYDROCHLORIDE 40 MG/1
5 TABLET ORAL EVERY 6 HOURS
Qty: 0 | Refills: 0 | Status: DISCONTINUED | OUTPATIENT
Start: 2018-08-19 | End: 2018-08-23

## 2018-08-19 RX ORDER — MULTIVIT-MIN/FERROUS GLUCONATE 9 MG/15 ML
1 LIQUID (ML) ORAL DAILY
Qty: 0 | Refills: 0 | Status: DISCONTINUED | OUTPATIENT
Start: 2018-08-19 | End: 2018-08-23

## 2018-08-19 RX ORDER — IBUPROFEN 200 MG
600 TABLET ORAL EVERY 4 HOURS
Qty: 0 | Refills: 0 | Status: DISCONTINUED | OUTPATIENT
Start: 2018-08-19 | End: 2018-08-23

## 2018-08-19 RX ORDER — HYDROXYZINE HCL 10 MG
50 TABLET ORAL EVERY 6 HOURS
Qty: 0 | Refills: 0 | Status: DISCONTINUED | OUTPATIENT
Start: 2018-08-19 | End: 2018-08-23

## 2018-08-19 RX ORDER — MAGNESIUM HYDROXIDE 400 MG/1
30 TABLET, CHEWABLE ORAL ONCE
Qty: 0 | Refills: 0 | Status: DISCONTINUED | OUTPATIENT
Start: 2018-08-19 | End: 2018-08-23

## 2018-08-19 RX ORDER — TRAZODONE HCL 50 MG
50 TABLET ORAL AT BEDTIME
Qty: 0 | Refills: 0 | Status: DISCONTINUED | OUTPATIENT
Start: 2018-08-19 | End: 2018-08-23

## 2018-08-19 RX ORDER — FLUOXETINE HCL 10 MG
10 CAPSULE ORAL DAILY
Qty: 0 | Refills: 0 | Status: DISCONTINUED | OUTPATIENT
Start: 2018-08-19 | End: 2018-08-23

## 2018-08-19 RX ORDER — ACETAMINOPHEN 500 MG
650 TABLET ORAL EVERY 4 HOURS
Qty: 0 | Refills: 0 | Status: DISCONTINUED | OUTPATIENT
Start: 2018-08-19 | End: 2018-08-23

## 2018-08-19 RX ORDER — GABAPENTIN 400 MG/1
300 CAPSULE ORAL EVERY 8 HOURS
Qty: 0 | Refills: 0 | Status: DISCONTINUED | OUTPATIENT
Start: 2018-08-19 | End: 2018-08-23

## 2018-08-19 RX ORDER — METHADONE HYDROCHLORIDE 40 MG/1
10 TABLET ORAL ONCE
Qty: 0 | Refills: 0 | Status: DISCONTINUED | OUTPATIENT
Start: 2018-08-19 | End: 2018-08-19

## 2018-08-19 RX ORDER — METHADONE HYDROCHLORIDE 40 MG/1
10 TABLET ORAL EVERY 12 HOURS
Qty: 0 | Refills: 0 | Status: DISCONTINUED | OUTPATIENT
Start: 2018-08-19 | End: 2018-08-20

## 2018-08-19 RX ADMIN — METHADONE HYDROCHLORIDE 10 MILLIGRAM(S): 40 TABLET ORAL at 21:37

## 2018-08-19 RX ADMIN — Medication 50 MILLIGRAM(S): at 22:54

## 2018-08-19 RX ADMIN — Medication 50 MILLIGRAM(S): at 16:16

## 2018-08-19 RX ADMIN — Medication 50 MILLIGRAM(S): at 19:26

## 2018-08-19 NOTE — ED PROVIDER NOTE - PROGRESS NOTE DETAILS
josie York at [facility], states they have male bed, recommends admission to [facility] as dr botello is on vacation

## 2018-08-19 NOTE — ED PROVIDER NOTE - PHYSICAL EXAMINATION
VITAL SIGNS: AFebrile, vital signs stable  CONSTITUTIONAL: Well-developed; well-nourished; in no acute distress.  SKIN: Skin exam is warm and dry, no acute rash.  HEAD: Normocephalic; atraumatic.  EYES: Pupils equal round reactive to light, Extraocular movements intact; conjunctiva and sclera clear.  ENT: No nasal discharge; airway clear. Moist mucus membranes.  NECK: Supple; non tender. No rigidity  CARD: Regular rate and rhythm. Normal S1, S2; no murmurs, gallops, or rubs.  RESP: Lungs clear to auscultation bilaterally. No wheezes, rales or rhonchi.  ABD: Abdomen soft; non-tender; non-distended;  no hepatosplenomegaly. No costovertebral angle tenderness.   EXT: Normal ROM. No clubbing, cyanosis or edema. No calf tenderness to palpation.  NEURO: Alert and oriented x 3. No focal deficits. no tongue fasciculations or hand tremors.   PSYCH: Cooperative, appropriate.

## 2018-08-19 NOTE — H&P ADULT - ATTENDING COMMENTS
Patient interviewed and examined.    Chart reviewed.    PA's H&P noted and modified, as appropriate.    Case discussed on team rounds    Following is my summary of the case.    Admitted for detox: from ___x_ED, ___Intake, ____Med/Surg Floor    Alcohol___x_   Opioid____x_  Benzo___ Other_____    Substance amount, duration of use, last usage, and prior attempts at detox or rehabs, are outlined above in the H&P and discussed with patient.    Associated withdrawal symptoms presents.  Comorbid conditions noted. Chronic and Stable.    Past Medical Hx, Psych Hx, family Hx, Social Hx from H&P reviewed and NO changes.    Old medical record and medication Hx. Reviewed    Following items reviewed and addressed:  1. labs  2. EKG  3. Imaging from PACs module    Examination: no change from PA's exam.    Place on following protocol  _____Medically Managed  __X__Medically Supervised    Ciwa____x_Librium taper____Ativan taper___Methadone taper___ xPhenobarb taper____ Suboxone Induction____MMTP____    Narcan Kit Offered    Psych Consult __X__N/A  ___Ordered    Physical Therapy  ___X    ___  Ordered    Aftercare disposition to be addressed by counselors.    Estimated length of stay 3-5 days.

## 2018-08-19 NOTE — ED PROVIDER NOTE - OBJECTIVE STATEMENT
62M PMH ETOH abuse, presents w request for detox. has been once many years ago. states feels "sick" because last drink was at 12am and feels withdrawal symptoms starting. no fever, cough. no cp, sob, abd pain, nvdc, dysuria, freq, ha, weakness, numbness, neck pain. no trauma. hx of w/d sz in the past. no drugs, etoh. states he drank a lot of etoh yesterday, cant remember how much. no sz. no si/hi, hallucinations. 62M PMH ETOH abuse, presents w request for detox. has been once many years ago. states feels "sick" because last drink was at 12am and feels withdrawal symptoms starting. no fever, cough. no cp, sob, abd pain, nvdc, dysuria, freq, ha, weakness, numbness, neck pain. no trauma. hx of w/d sz in the past. no drugs, etoh. states he drank a lot of etoh yesterday, cant remember how much. no sz. no si/hi, hallucinations. states he stopped drinking last night because he wants to quit.

## 2018-08-19 NOTE — H&P ADULT - PROBLEM SELECTOR PLAN 1
begin high-dose librium protocol, continue to monitor begin high-dose librium protocol, continue to monitor (for seizures, s/sx of withdrawal)

## 2018-08-19 NOTE — ED ADULT NURSE NOTE - OBJECTIVE STATEMENT
pt here requesting detox front alcohol. states he drink 1/2 pint of vodka daily, last drink was yesterday evening.

## 2018-08-19 NOTE — ED ADULT NURSE NOTE - NSIMPLEMENTINTERV_GEN_ALL_ED
Implemented All Fall with Harm Risk Interventions:  [place] to call system. Call bell, personal items and telephone within reach. Instruct patient to call for assistance. Room bathroom lighting operational. Non-slip footwear when patient is off stretcher. Physically safe environment: no spills, clutter or unnecessary equipment. Stretcher in lowest position, wheels locked, appropriate side rails in place. Provide visual cue, wrist band, yellow gown, etc. Monitor gait and stability. Monitor for mental status changes and reorient to person, place, and time. Review medications for side effects contributing to fall risk. Reinforce activity limits and safety measures with patient and family. Provide visual clues: red socks.

## 2018-08-19 NOTE — ED PROVIDER NOTE - CARE PLAN
Principal Discharge DX:	Alcohol abuse Principal Discharge DX:	Alcohol abuse  Assessment and plan of treatment:	a/p: ETOH abuse, last drink 10-12 hrs ago, no tremors at this time or signs of w/d but pt states he feels symptoms starting, will give librium, pt requesting detox, will send labs and admit.

## 2018-08-19 NOTE — H&P ADULT - HISTORY OF PRESENT ILLNESS
63yo M (poor historian) presenting for detox from EtOH and heroin. Pt reports drinking 1/2 gallon of vodka/day x2 years (last drank yesterday), 9 bags of intranasal heroin/day (last use yesterday), and intranasal cocaine (quantity/last use unclear). Denies any recent IVDU or other substance abuse. Does not remember how many prior detoxes he has had, reports h/o blackouts and withdrawal seizures (last unclear).  Reports multiple episodes of NBNB vomiting yesterday, denies any CP, SOB, or other acute sx.

## 2018-08-19 NOTE — PROVIDER CONTACT NOTE (OTHER) - SITUATION
MD RASMUSSEN made aware that I was unable to obtain all ordered labs from patient after multiple attempts.

## 2018-08-19 NOTE — H&P ADULT - NSHPLABSRESULTS_GEN_ALL_CORE
13.9   5.79  )-----------( 318      ( 19 Aug 2018 09:40 )             43.3   08-19    140  |  104  |  7<L>  ----------------------------<  143<H>  4.7   |  22  |  1.0    Ca    9.1      19 Aug 2018 09:40  Mg     1.9     08-19    TPro  6.9  /  Alb  4.0  /  TBili  0.3  /  DBili  x   /  AST  20  /  ALT  12  /  AlkPhos  100  08-19

## 2018-08-19 NOTE — ED PROVIDER NOTE - PLAN OF CARE
a/p: ETOH abuse, last drink 10-12 hrs ago, no tremors at this time or signs of w/d but pt states he feels symptoms starting, will give librium, pt requesting detox, will send labs and admit.

## 2018-08-20 LAB
HBV CORE IGM SER-ACNC: SIGNIFICANT CHANGE UP
HBV SURFACE AG SER-ACNC: SIGNIFICANT CHANGE UP
HCV AB S/CO SERPL IA: 0.17 S/CO — SIGNIFICANT CHANGE UP
HCV AB SERPL-IMP: SIGNIFICANT CHANGE UP

## 2018-08-20 RX ADMIN — Medication 1 TABLET(S): at 09:37

## 2018-08-20 RX ADMIN — GABAPENTIN 300 MILLIGRAM(S): 400 CAPSULE ORAL at 06:29

## 2018-08-20 RX ADMIN — Medication 50 MILLIGRAM(S): at 09:38

## 2018-08-20 RX ADMIN — Medication 50 MILLIGRAM(S): at 18:20

## 2018-08-20 RX ADMIN — Medication 50 MILLIGRAM(S): at 21:24

## 2018-08-20 RX ADMIN — GABAPENTIN 300 MILLIGRAM(S): 400 CAPSULE ORAL at 13:11

## 2018-08-20 RX ADMIN — GABAPENTIN 300 MILLIGRAM(S): 400 CAPSULE ORAL at 21:24

## 2018-08-20 RX ADMIN — Medication 50 MILLIGRAM(S): at 13:11

## 2018-08-20 RX ADMIN — METHADONE HYDROCHLORIDE 10 MILLIGRAM(S): 40 TABLET ORAL at 09:37

## 2018-08-20 RX ADMIN — Medication 50 MILLIGRAM(S): at 06:27

## 2018-08-20 RX ADMIN — Medication 25 MILLIGRAM(S): at 22:11

## 2018-08-20 RX ADMIN — METHADONE HYDROCHLORIDE 5 MILLIGRAM(S): 40 TABLET ORAL at 21:25

## 2018-08-20 RX ADMIN — Medication 10 MILLIGRAM(S): at 09:37

## 2018-08-21 LAB — T PALLIDUM AB TITR SER: NEGATIVE — SIGNIFICANT CHANGE UP

## 2018-08-21 RX ADMIN — Medication 600 MILLIGRAM(S): at 12:29

## 2018-08-21 RX ADMIN — Medication 10 MILLIGRAM(S): at 09:56

## 2018-08-21 RX ADMIN — Medication 25 MILLIGRAM(S): at 06:11

## 2018-08-21 RX ADMIN — GABAPENTIN 300 MILLIGRAM(S): 400 CAPSULE ORAL at 14:36

## 2018-08-21 RX ADMIN — Medication 50 MILLIGRAM(S): at 21:05

## 2018-08-21 RX ADMIN — Medication 20 MILLIGRAM(S): at 21:05

## 2018-08-21 RX ADMIN — GABAPENTIN 300 MILLIGRAM(S): 400 CAPSULE ORAL at 06:11

## 2018-08-21 RX ADMIN — Medication 300 MILLIGRAM(S): at 09:57

## 2018-08-21 RX ADMIN — Medication 25 MILLIGRAM(S): at 09:56

## 2018-08-21 RX ADMIN — GABAPENTIN 300 MILLIGRAM(S): 400 CAPSULE ORAL at 21:06

## 2018-08-21 RX ADMIN — METHADONE HYDROCHLORIDE 5 MILLIGRAM(S): 40 TABLET ORAL at 09:57

## 2018-08-21 RX ADMIN — Medication 25 MILLIGRAM(S): at 18:03

## 2018-08-21 RX ADMIN — Medication 25 MILLIGRAM(S): at 14:36

## 2018-08-21 RX ADMIN — METHADONE HYDROCHLORIDE 5 MILLIGRAM(S): 40 TABLET ORAL at 21:08

## 2018-08-21 RX ADMIN — Medication 1 TABLET(S): at 09:57

## 2018-08-21 RX ADMIN — Medication 600 MILLIGRAM(S): at 09:58

## 2018-08-21 NOTE — CHART NOTE - NSCHARTNOTEFT_GEN_A_CORE
Subsequent Inpatient Encounter                                       Detox Unit    MARIELENA HARPER   62y   Male      Chief Complaint:    Follow up for Alcohol / opiate Dependency    HPI:     I reviewed previous notes. No Change, except if noted below.             Detail:_    ROS:   I reviewed with patient.  No changes from previous notes except if noted below.             Detail: _    PFSH I reviewed with patient. No changes from previous notes except if noted below.             Detail_    Medication reconciliation performed.    MEDICATIONS  (STANDING):  chlordiazePOXIDE 20 milliGRAM(s) Oral every 4 hours  chlordiazePOXIDE 25 milliGRAM(s) Oral every 4 hours  chlordiazePOXIDE   Oral   FLUoxetine 10 milliGRAM(s) Oral daily  gabapentin 300 milliGRAM(s) Oral every 8 hours  methadone    Tablet   Oral   methadone    Tablet 5 milliGRAM(s) Oral every 12 hours  multivitamin/minerals 1 Tablet(s) Oral daily  traZODone 50 milliGRAM(s) Oral at bedtime      MEDICATIONS  (PRN):  acetaminophen   Tablet 650 milliGRAM(s) Oral every 4 hours PRN For Temp greater than 38.5 C (101.3 F)  aluminum hydroxide/magnesium hydroxide/simethicone Suspension 30 milliLiter(s) Oral every 6 hours PRN Heartburn  bismuth subsalicylate Liquid 30 milliLiter(s) Oral every 6 hours PRN Diarrhea  chlordiazePOXIDE 50 milliGRAM(s) Oral every 4 hours PRN Withdrawal  guaiFENesin/dextromethorphan  Syrup 5 milliLiter(s) Oral every 4 hours PRN Cough  hydrOXYzine hydrochloride 50 milliGRAM(s) Oral every 6 hours PRN Anxiety  hydrOXYzine hydrochloride 100 milliGRAM(s) Oral at bedtime PRN insomnia  ibuprofen  Tablet 600 milliGRAM(s) Oral every 4 hours PRN Mild Pain (1-3)  magnesium hydroxide Suspension 30 milliLiter(s) Oral once PRN Constipation  methadone    Tablet 5 milliGRAM(s) Oral every 6 hours PRN Opiate Withdrawal  trimethobenzamide 300 milliGRAM(s) Oral every 6 hours PRN Nausea and/or Vomiting  trimethobenzamide Injectable 200 milliGRAM(s) IntraMuscular every 6 hours PRN Nausea and/or Vomiting      T(C): 36.6 (08-21-18 @ 06:00), Max: 37 (08-20-18 @ 18:00)  HR: 63 (08-21-18 @ 06:00) (63 - 101)  BP: 100/61 (08-21-18 @ 06:00) (100/59 - 111/67)  RR: 16 (08-21-18 @ 06:00) (14 - 16)  SpO2: --    PHYSICAL EXAM:      Constitutional: NAD, A&O x3    Eyes: PERRLA, no conjuctivitis    Neck: no lymphadenopathy    Respiratory: +air entry, no rales, no rhonchi, no wheezes    Cardiovascular: +S1 and S2, regular rate and rhythm    Gastrointestinal: +BS, soft, non-tender, not distended    Extremities:  no edema, no calf tenderness    Skin: no rashes, normal turgor                            13.9   5.79  )-----------( 318      ( 19 Aug 2018 09:40 )             43.3   08-19    140  |  104  |  7<L>  ----------------------------<  143<H>  4.7   |  22  |  1.0    Ca    9.1      19 Aug 2018 09:40  Mg     1.9     08-19    TPro  6.9  /  Alb  4.0  /  TBili  0.3  /  DBili  x   /  AST  20  /  ALT  12  /  AlkPhos  100  08-19  PT/INR - ( 19 Aug 2018 09:40 )   PT: 10.40 sec;   INR: 0.96 ratio         PTT - ( 19 Aug 2018 09:40 )  PTT:35.2 sec  Magnesium, Serum: 1.9 mg/dL (08-19-18 @ 09:40)  Treponema Pallidum Antibody Interpretation: Negative (08-19-18 @ 09:40)  Hepatitis B Surface Antigen: Nonreact (08-19-18 @ 09:40)  Hepatitis C Virus S/CO Ratio: 0.17 S/CO (08-19-18 @ 09:40)    Hepatitis C Virus Interpretation: Nonreact (08-19-18 @ 09:40)            Impression and Plan:    Primary Diagnosis:  Alcohol Dependency                                Medication: Librium Protocol/ CIWA Protocol    Secondary Diagnosis:  Opiate dependency                                                                Medication: methadone taper     Tertiary Diagnosis:                                                                       Medication      Continue Detox Protocols. Use of PRNS as needed for withdrawal and comfort.    Adjustments to protocols:    Labs/ Tests reviewed.    Tests ordered:     Likely Disposition: __x_Home       ___Rehab       ___Outpatient Program    ___Self Help     _____Other    Estimated Length of stay:____

## 2018-08-22 RX ADMIN — METHADONE HYDROCHLORIDE 5 MILLIGRAM(S): 40 TABLET ORAL at 10:07

## 2018-08-22 RX ADMIN — Medication 10 MILLIGRAM(S): at 21:21

## 2018-08-22 RX ADMIN — Medication 50 MILLIGRAM(S): at 21:21

## 2018-08-22 RX ADMIN — GABAPENTIN 300 MILLIGRAM(S): 400 CAPSULE ORAL at 13:14

## 2018-08-22 RX ADMIN — GABAPENTIN 300 MILLIGRAM(S): 400 CAPSULE ORAL at 21:21

## 2018-08-22 RX ADMIN — Medication 20 MILLIGRAM(S): at 13:13

## 2018-08-22 RX ADMIN — Medication 20 MILLIGRAM(S): at 05:56

## 2018-08-22 RX ADMIN — Medication 1 TABLET(S): at 10:08

## 2018-08-22 RX ADMIN — Medication 10 MILLIGRAM(S): at 10:07

## 2018-08-22 RX ADMIN — Medication 20 MILLIGRAM(S): at 10:07

## 2018-08-22 RX ADMIN — GABAPENTIN 300 MILLIGRAM(S): 400 CAPSULE ORAL at 05:56

## 2018-08-22 NOTE — CHART NOTE - NSCHARTNOTEFT_GEN_A_CORE
Subsequent Inpatient Encounter                                       Detox Unit    MARIELENA HARPER   62y   Male      Chief Complaint:    Follow up for Alcohol and opiate Dependency    HPI:     I reviewed previous notes. No Change, except if noted below.             Detail:_    ROS:   I reviewed with patient.  No changes from previous notes except if noted below.             Detail: _    PFSH I reviewed with patient. No changes from previous notes except if noted below.             Detail_    Medication reconciliation performed.    MEDICATIONS  (STANDING):  chlordiazePOXIDE 20 milliGRAM(s) Oral every 4 hours  chlordiazePOXIDE 10 milliGRAM(s) Oral every 4 hours  chlordiazePOXIDE   Oral   FLUoxetine 10 milliGRAM(s) Oral daily  gabapentin 300 milliGRAM(s) Oral every 8 hours  methadone    Tablet   Oral   methadone    Tablet 5 milliGRAM(s) Oral every 12 hours  multivitamin/minerals 1 Tablet(s) Oral daily  traZODone 50 milliGRAM(s) Oral at bedtime      MEDICATIONS  (PRN):  acetaminophen   Tablet 650 milliGRAM(s) Oral every 4 hours PRN For Temp greater than 38.5 C (101.3 F)  aluminum hydroxide/magnesium hydroxide/simethicone Suspension 30 milliLiter(s) Oral every 6 hours PRN Heartburn  bismuth subsalicylate Liquid 30 milliLiter(s) Oral every 6 hours PRN Diarrhea  chlordiazePOXIDE 50 milliGRAM(s) Oral every 4 hours PRN Withdrawal  guaiFENesin/dextromethorphan  Syrup 5 milliLiter(s) Oral every 4 hours PRN Cough  hydrOXYzine hydrochloride 50 milliGRAM(s) Oral every 6 hours PRN Anxiety  hydrOXYzine hydrochloride 100 milliGRAM(s) Oral at bedtime PRN insomnia  ibuprofen  Tablet 600 milliGRAM(s) Oral every 4 hours PRN Mild Pain (1-3)  magnesium hydroxide Suspension 30 milliLiter(s) Oral once PRN Constipation  methadone    Tablet 5 milliGRAM(s) Oral every 6 hours PRN Opiate Withdrawal  trimethobenzamide 300 milliGRAM(s) Oral every 6 hours PRN Nausea and/or Vomiting  trimethobenzamide Injectable 200 milliGRAM(s) IntraMuscular every 6 hours PRN Nausea and/or Vomiting  trimethobenzamide Injectable 200 milliGRAM(s) IntraMuscular every 8 hours PRN Nausea and/or Vomiting      T(C): 35.9 (08-22-18 @ 06:00), Max: 36.5 (08-21-18 @ 11:51)  HR: 102 (08-22-18 @ 06:00) (78 - 102)  BP: 126/71 (08-22-18 @ 06:00) (100/52 - 126/71)  RR: 16 (08-22-18 @ 06:00) (14 - 16)  SpO2: --    PHYSICAL EXAM:      Constitutional: NAD, A&O x3    Eyes: PERRLA, no conjuctivitis    Neck: no lymphadenopathy    Respiratory: +air entry, no rales, no rhonchi, no wheezes    Cardiovascular: +S1 and S2, regular rate and rhythm    Gastrointestinal: +BS, soft, non-tender, not distended    Extremities:  no edema, no calf tenderness    Skin: no rashes, normal turgor            Magnesium, Serum: 1.9 mg/dL (08-19-18 @ 09:40)  Treponema Pallidum Antibody Interpretation: Negative (08-19-18 @ 09:40)  Hepatitis B Surface Antigen: Nonreact (08-19-18 @ 09:40)  Hepatitis C Virus S/CO Ratio: 0.17 S/CO (08-19-18 @ 09:40)    Hepatitis C Virus Interpretation: Nonreact (08-19-18 @ 09:40)            Impression and Plan:    Primary Diagnosis:  Alcohol and opiate Dependency                      Medication: Librium and methadone protocol    Secondary Diagnosis:      mood disorder                                     Medication: prozac    Tertiary Diagnosis:                                                                       Medication      Continue Detox Protocols. Use of PRNS as needed for withdrawal and comfort.    Adjustments to protocols:    Labs/ Tests reviewed.    Tests ordered:     Likely Disposition: _x__Home       ___Rehab       ___Outpatient Program    ___Self Help     _____Other    Estimated Length of stay:____4 day

## 2018-08-23 ENCOUNTER — INPATIENT (INPATIENT)
Facility: HOSPITAL | Age: 63
LOS: 0 days | Discharge: LEFT BEFORE DISCHARGE | End: 2018-08-24
Attending: INTERNAL MEDICINE | Admitting: INTERNAL MEDICINE

## 2018-08-23 VITALS
SYSTOLIC BLOOD PRESSURE: 136 MMHG | HEART RATE: 99 BPM | RESPIRATION RATE: 16 BRPM | TEMPERATURE: 97 F | DIASTOLIC BLOOD PRESSURE: 87 MMHG

## 2018-08-23 VITALS
RESPIRATION RATE: 16 BRPM | DIASTOLIC BLOOD PRESSURE: 94 MMHG | TEMPERATURE: 98 F | HEIGHT: 74 IN | HEART RATE: 106 BPM | WEIGHT: 220.02 LBS | SYSTOLIC BLOOD PRESSURE: 133 MMHG

## 2018-08-23 DIAGNOSIS — Z02.9 ENCOUNTER FOR ADMINISTRATIVE EXAMINATIONS, UNSPECIFIED: ICD-10-CM

## 2018-08-23 DIAGNOSIS — F11.20 OPIOID DEPENDENCE, UNCOMPLICATED: ICD-10-CM

## 2018-08-23 PROBLEM — F10.10 ALCOHOL ABUSE, UNCOMPLICATED: Chronic | Status: ACTIVE | Noted: 2018-08-19

## 2018-08-23 RX ORDER — PSEUDOEPHEDRINE HCL 30 MG
60 TABLET ORAL EVERY 6 HOURS
Qty: 0 | Refills: 0 | Status: DISCONTINUED | OUTPATIENT
Start: 2018-08-23 | End: 2018-08-24

## 2018-08-23 RX ORDER — IBUPROFEN 200 MG
400 TABLET ORAL EVERY 6 HOURS
Qty: 0 | Refills: 0 | Status: DISCONTINUED | OUTPATIENT
Start: 2018-08-23 | End: 2018-08-24

## 2018-08-23 RX ORDER — TRAZODONE HCL 50 MG
1 TABLET ORAL
Qty: 0 | Refills: 0 | COMMUNITY

## 2018-08-23 RX ORDER — GABAPENTIN 400 MG/1
1 CAPSULE ORAL
Qty: 0 | Refills: 0 | COMMUNITY

## 2018-08-23 RX ORDER — MAGNESIUM HYDROXIDE 400 MG/1
30 TABLET, CHEWABLE ORAL ONCE
Qty: 0 | Refills: 0 | Status: DISCONTINUED | OUTPATIENT
Start: 2018-08-23 | End: 2018-08-24

## 2018-08-23 RX ORDER — NICOTINE POLACRILEX 2 MG
1 GUM BUCCAL DAILY
Qty: 0 | Refills: 0 | Status: DISCONTINUED | OUTPATIENT
Start: 2018-08-23 | End: 2018-08-24

## 2018-08-23 RX ORDER — TRAZODONE HCL 50 MG
1 TABLET ORAL
Qty: 0 | Refills: 0 | COMMUNITY
Start: 2018-08-23

## 2018-08-23 RX ORDER — GABAPENTIN 400 MG/1
300 CAPSULE ORAL EVERY 8 HOURS
Qty: 0 | Refills: 0 | Status: DISCONTINUED | OUTPATIENT
Start: 2018-08-23 | End: 2018-08-24

## 2018-08-23 RX ORDER — FLUOXETINE HCL 10 MG
1 CAPSULE ORAL
Qty: 0 | Refills: 0 | COMMUNITY

## 2018-08-23 RX ORDER — TRAZODONE HCL 50 MG
50 TABLET ORAL AT BEDTIME
Qty: 0 | Refills: 0 | Status: DISCONTINUED | OUTPATIENT
Start: 2018-08-23 | End: 2018-08-24

## 2018-08-23 RX ORDER — GABAPENTIN 400 MG/1
1 CAPSULE ORAL
Qty: 0 | Refills: 0 | COMMUNITY
Start: 2018-08-23

## 2018-08-23 RX ORDER — HYDROXYZINE HCL 10 MG
50 TABLET ORAL EVERY 6 HOURS
Qty: 0 | Refills: 0 | Status: DISCONTINUED | OUTPATIENT
Start: 2018-08-23 | End: 2018-08-24

## 2018-08-23 RX ORDER — ACETAMINOPHEN 500 MG
650 TABLET ORAL EVERY 6 HOURS
Qty: 0 | Refills: 0 | Status: DISCONTINUED | OUTPATIENT
Start: 2018-08-23 | End: 2018-08-24

## 2018-08-23 RX ORDER — MULTIVIT-MIN/FERROUS GLUCONATE 9 MG/15 ML
1 LIQUID (ML) ORAL DAILY
Qty: 0 | Refills: 0 | Status: DISCONTINUED | OUTPATIENT
Start: 2018-08-23 | End: 2018-08-24

## 2018-08-23 RX ORDER — FLUOXETINE HCL 10 MG
1 CAPSULE ORAL
Qty: 0 | Refills: 0 | COMMUNITY
Start: 2018-08-23

## 2018-08-23 RX ORDER — FLUOXETINE HCL 10 MG
10 CAPSULE ORAL DAILY
Qty: 0 | Refills: 0 | Status: DISCONTINUED | OUTPATIENT
Start: 2018-08-23 | End: 2018-08-24

## 2018-08-23 RX ORDER — RISPERIDONE 4 MG/1
0.5 TABLET ORAL AT BEDTIME
Qty: 0 | Refills: 0 | Status: DISCONTINUED | OUTPATIENT
Start: 2018-08-23 | End: 2018-08-24

## 2018-08-23 RX ADMIN — GABAPENTIN 300 MILLIGRAM(S): 400 CAPSULE ORAL at 21:27

## 2018-08-23 RX ADMIN — RISPERIDONE 0.5 MILLIGRAM(S): 4 TABLET ORAL at 21:26

## 2018-08-23 RX ADMIN — Medication 10 MILLIGRAM(S): at 08:45

## 2018-08-23 RX ADMIN — Medication 10 MILLIGRAM(S): at 09:37

## 2018-08-23 RX ADMIN — Medication 1 TABLET(S): at 08:45

## 2018-08-23 RX ADMIN — Medication 10 MILLIGRAM(S): at 05:54

## 2018-08-23 RX ADMIN — GABAPENTIN 300 MILLIGRAM(S): 400 CAPSULE ORAL at 05:54

## 2018-08-23 NOTE — BEHAVIORAL HEALTH ASSESSMENT NOTE - HPI (INCLUDE ILLNESS QUALITY, SEVERITY, DURATION, TIMING, CONTEXT, MODIFYING FACTORS, ASSOCIATED SIGNS AND SYMPTOMS)
Pt is a very poor historian. He says he is confused. He knows where he is, why. Know the current date, ( though he needs to be prompted. at first, he did not know the month or year ), his address, his . Does not know his medical problems, meds. Can not do any simple calculations, can not process information properly. Pt was transferred to [facility] from detox today. He does not remember when and how much he drank last time. Pt is a 63yo m, , single, has a daughter, with long h/o ETOH and heroine abuse, h/o severe w/d (can not elaborate ), with h/o mental illness, prior IPP, unclear h/o SA, medications, with long h/o AH and PI, who was transferred today from detox and who wanted to leave. Psych eval was requested because the pt appeared confused. By the time that I came, Pt changed his mind and decided to stay. Says that he feels confused and can not leave. Pt is not aggressive or agitated. Adamantly denies SI/HI. Says that he has chronic AH/PI. Says that at times he has CAH, but denies having it at present time. Pt says that he is fine and he does not want 1:1 or to be transferred to P.

## 2018-08-23 NOTE — CHART NOTE - NSCHARTNOTEFT_GEN_A_CORE
Allergies:  sulfonamides (Other)  tetracycline (Rash)      Diet: Regular    Activity: as tolerated    Follow up with    1. PMD in 2 weeks    2. Psych in 2 weeks    3.    Follow up for abnormal labs/tests    1.    Extra Instructions:      Flu Vaccine given  Yes_____         No______      Diagnosis:  Chemical Dependency   Maintain sobriety  refrain from all use      Patient Signature___________________________________________  Date_________________      Nurse Signature_____________________________________________Date_________________

## 2018-08-23 NOTE — BEHAVIORAL HEALTH ASSESSMENT NOTE - NSBHCONSULTWORKUPDETAILS_PSY_A_CORE FT
take vitals in 1h. If the pt remains tachycardic, please call medical PA and medicaid with benzos. ( was d/w Krystina Toth and [facility] )

## 2018-08-23 NOTE — BEHAVIORAL HEALTH ASSESSMENT NOTE - SUMMARY
Pt is a very poor historian. He says he is confused. He knows where he is, why. Know the current date, ( though he needs to be prompted. at first, he did not know the month or year ), his address, his . Does not know his medical problems, meds. Can not do any simple calculations, can not process information properly. Pt was transferred to [facility] from detox today. He does not remember when and how much he drank last time. Pt is a 63yo m, , single, has a daughter, with long h/o ETOH and heroine abuse, h/o severe w/d (can not elaborate ), with h/o mental illness, prior IPP, unclear h/o SA, medications, with long h/o AH and PI, who was transferred today from detox and who wanted to leave. Psych eval was requested because the pt appeared confused. By the time that I came, Pt changed his mind and decided to stay. Says that he feels confused and can not leave. Pt is not aggressive or agitated. Adamantly denies SI/HI. Says that he has chronic AH/PI. Says that at times he has CAH, but denies having it at present time. Pt says that he is fine and he does not want 1:1 or to be transferred to P.

## 2018-08-23 NOTE — BEHAVIORAL HEALTH ASSESSMENT NOTE - NSBHREFER_PSY_A_CORE
Message  Received call from [facility] radiology  Patient has an appointment on 7/11 at 8: 30 in the morning for a CTA left lower extremity  Upon review of venous and arterial Dopplers from [facility] patient actually needs a CT pelvis abdomen to further evaluate a cystic complex mass in the left inguinal region  CTA needs to be canceled and CT scheduled        Signatures   Electronically signed by : Rosamaria Montiel, [id number]; Jul 5 2017  3:18PM EST                       (Author)
inpatient Medical/Surgical team

## 2018-08-24 VITALS
TEMPERATURE: 97 F | SYSTOLIC BLOOD PRESSURE: 102 MMHG | DIASTOLIC BLOOD PRESSURE: 61 MMHG | HEART RATE: 92 BPM | RESPIRATION RATE: 18 BRPM

## 2018-08-24 RX ADMIN — Medication 50 MILLIGRAM(S): at 01:49

## 2018-08-24 NOTE — CHART NOTE - NSCHARTNOTEFT_GEN_A_CORE
Called by nurse regarding pt wanted to leave AMA. Seen and talk to, risk of leaving AMA  and especially, at this time of the night explained, including delay in transportation and also, possible withdrawal symptoms. Pt verbalized understanding and insist he has to leave. Pt is AAOx3, NAD. Pt was seen by Psych earlier and recommends Psych F/U. Will call psych for eval as well.

## 2018-08-24 NOTE — ED BEHAVIORAL HEALTH NOTE - BEHAVIORAL HEALTH NOTE
I was asked to reevaluate the pt for capacity as he wants to leave. Pt is oriented at this time. He knows the month and year. He knows where he is and why. He knows the president, he knows our presidents before president Jon, he knows his address, knows how to get to his place. He needs to be prompted, but he is logical. He knows his insurance, etc. It seems like he has w/d from ETOH but it is mild at this time. He denies feeling depressed, adamantly denies SI/HI/AH. He says that he is angry because I don't let him go. He remembers my previous conversation with him in details. He threw up in the middle of our conversation with him. He says that he wants to go home, to take a warm shower and to relax. Pt is not agitated. Pt adamantly refuses to stay. His involuntary admission can not be justified at this time. We tried to convince him to stay till am but he wants to leave. Pt has capacity to sign AMA at this time. His ps is 92. The AND was present during our conversation.

## 2018-08-30 ENCOUNTER — EMERGENCY (EMERGENCY)
Facility: HOSPITAL | Age: 63
LOS: 0 days | Discharge: HOME | End: 2018-08-30
Attending: EMERGENCY MEDICINE | Admitting: EMERGENCY MEDICINE

## 2018-08-30 VITALS — HEIGHT: 74 IN | WEIGHT: 220.02 LBS

## 2018-08-30 VITALS
HEART RATE: 91 BPM | SYSTOLIC BLOOD PRESSURE: 115 MMHG | OXYGEN SATURATION: 96 % | DIASTOLIC BLOOD PRESSURE: 61 MMHG | RESPIRATION RATE: 18 BRPM | TEMPERATURE: 99 F

## 2018-08-30 DIAGNOSIS — F10.10 ALCOHOL ABUSE, UNCOMPLICATED: ICD-10-CM

## 2018-08-30 DIAGNOSIS — Z79.899 OTHER LONG TERM (CURRENT) DRUG THERAPY: ICD-10-CM

## 2018-08-30 DIAGNOSIS — R07.89 OTHER CHEST PAIN: ICD-10-CM

## 2018-08-30 DIAGNOSIS — Z88.2 ALLERGY STATUS TO SULFONAMIDES: ICD-10-CM

## 2018-08-30 DIAGNOSIS — Z88.1 ALLERGY STATUS TO OTHER ANTIBIOTIC AGENTS STATUS: ICD-10-CM

## 2018-08-30 LAB
ALBUMIN SERPL ELPH-MCNC: 4.3 G/DL — SIGNIFICANT CHANGE UP (ref 3.5–5.2)
ALP SERPL-CCNC: 89 U/L — SIGNIFICANT CHANGE UP (ref 30–115)
ALT FLD-CCNC: 15 U/L — SIGNIFICANT CHANGE UP (ref 0–41)
ANION GAP SERPL CALC-SCNC: 18 MMOL/L — HIGH (ref 7–14)
AST SERPL-CCNC: 21 U/L — SIGNIFICANT CHANGE UP (ref 0–41)
BASE EXCESS BLDV CALC-SCNC: 1.9 MMOL/L — SIGNIFICANT CHANGE UP (ref -2–2)
BASOPHILS # BLD AUTO: 0.05 K/UL — SIGNIFICANT CHANGE UP (ref 0–0.2)
BASOPHILS NFR BLD AUTO: 0.9 % — SIGNIFICANT CHANGE UP (ref 0–1)
BILIRUB SERPL-MCNC: 0.2 MG/DL — SIGNIFICANT CHANGE UP (ref 0.2–1.2)
BLD GP AB SCN SERPL QL: SIGNIFICANT CHANGE UP
BUN SERPL-MCNC: 6 MG/DL — LOW (ref 10–20)
CA-I SERPL-SCNC: 1.16 MMOL/L — SIGNIFICANT CHANGE UP (ref 1.12–1.3)
CALCIUM SERPL-MCNC: 9.1 MG/DL — SIGNIFICANT CHANGE UP (ref 8.5–10.1)
CHLORIDE SERPL-SCNC: 102 MMOL/L — SIGNIFICANT CHANGE UP (ref 98–110)
CO2 SERPL-SCNC: 21 MMOL/L — SIGNIFICANT CHANGE UP (ref 17–32)
CREAT SERPL-MCNC: 1 MG/DL — SIGNIFICANT CHANGE UP (ref 0.7–1.5)
EOSINOPHIL # BLD AUTO: 0.03 K/UL — SIGNIFICANT CHANGE UP (ref 0–0.7)
EOSINOPHIL NFR BLD AUTO: 0.5 % — SIGNIFICANT CHANGE UP (ref 0–8)
GAS PNL BLDV: 141 MMOL/L — SIGNIFICANT CHANGE UP (ref 136–145)
GAS PNL BLDV: SIGNIFICANT CHANGE UP
GLUCOSE SERPL-MCNC: 158 MG/DL — HIGH (ref 70–99)
HCO3 BLDV-SCNC: 26 MMOL/L — SIGNIFICANT CHANGE UP (ref 22–29)
HCT VFR BLD CALC: 37.5 % — LOW (ref 42–52)
HCT VFR BLDA CALC: 38.7 % — SIGNIFICANT CHANGE UP (ref 34–44)
HGB BLD CALC-MCNC: 12.6 G/DL — LOW (ref 14–18)
HGB BLD-MCNC: 12 G/DL — LOW (ref 14–18)
IMM GRANULOCYTES NFR BLD AUTO: 0.2 % — SIGNIFICANT CHANGE UP (ref 0.1–0.3)
LACTATE BLDV-MCNC: 2.2 MMOL/L — HIGH (ref 0.5–1.6)
LIDOCAIN IGE QN: 21 U/L — SIGNIFICANT CHANGE UP (ref 7–60)
LYMPHOCYTES # BLD AUTO: 2.12 K/UL — SIGNIFICANT CHANGE UP (ref 1.2–3.4)
LYMPHOCYTES # BLD AUTO: 36.7 % — SIGNIFICANT CHANGE UP (ref 20.5–51.1)
MAGNESIUM SERPL-MCNC: 1.7 MG/DL — LOW (ref 1.8–2.4)
MCHC RBC-ENTMCNC: 24.8 PG — LOW (ref 27–31)
MCHC RBC-ENTMCNC: 32 G/DL — SIGNIFICANT CHANGE UP (ref 32–37)
MCV RBC AUTO: 77.6 FL — LOW (ref 80–94)
MONOCYTES # BLD AUTO: 0.5 K/UL — SIGNIFICANT CHANGE UP (ref 0.1–0.6)
MONOCYTES NFR BLD AUTO: 8.7 % — SIGNIFICANT CHANGE UP (ref 1.7–9.3)
NEUTROPHILS # BLD AUTO: 3.06 K/UL — SIGNIFICANT CHANGE UP (ref 1.4–6.5)
NEUTROPHILS NFR BLD AUTO: 53 % — SIGNIFICANT CHANGE UP (ref 42.2–75.2)
NRBC # BLD: 0 /100 WBCS — SIGNIFICANT CHANGE UP (ref 0–0)
PCO2 BLDV: 39 MMHG — LOW (ref 41–51)
PH BLDV: 7.43 — SIGNIFICANT CHANGE UP (ref 7.26–7.43)
PHOSPHATE SERPL-MCNC: 2.9 MG/DL — SIGNIFICANT CHANGE UP (ref 2.1–4.9)
PLATELET # BLD AUTO: 263 K/UL — SIGNIFICANT CHANGE UP (ref 130–400)
PO2 BLDV: 44 MMHG — HIGH (ref 20–40)
POTASSIUM BLDV-SCNC: 4.3 MMOL/L — SIGNIFICANT CHANGE UP (ref 3.3–5.6)
POTASSIUM SERPL-MCNC: 4.7 MMOL/L — SIGNIFICANT CHANGE UP (ref 3.5–5)
POTASSIUM SERPL-SCNC: 4.7 MMOL/L — SIGNIFICANT CHANGE UP (ref 3.5–5)
PROT SERPL-MCNC: 7 G/DL — SIGNIFICANT CHANGE UP (ref 6–8)
RBC # BLD: 4.83 M/UL — SIGNIFICANT CHANGE UP (ref 4.7–6.1)
RBC # FLD: 19.5 % — HIGH (ref 11.5–14.5)
SAO2 % BLDV: 82 % — SIGNIFICANT CHANGE UP
SODIUM SERPL-SCNC: 141 MMOL/L — SIGNIFICANT CHANGE UP (ref 135–146)
TROPONIN T SERPL-MCNC: <0.01 NG/ML — SIGNIFICANT CHANGE UP
TYPE + AB SCN PNL BLD: SIGNIFICANT CHANGE UP
WBC # BLD: 5.77 K/UL — SIGNIFICANT CHANGE UP (ref 4.8–10.8)
WBC # FLD AUTO: 5.77 K/UL — SIGNIFICANT CHANGE UP (ref 4.8–10.8)

## 2018-08-30 RX ORDER — DIPHENHYDRAMINE HYDROCHLORIDE AND LIDOCAINE HYDROCHLORIDE AND ALUMINUM HYDROXIDE AND MAGNESIUM HYDRO
30 KIT ONCE
Qty: 0 | Refills: 0 | Status: COMPLETED | OUTPATIENT
Start: 2018-08-30 | End: 2018-08-30

## 2018-08-30 RX ORDER — SODIUM CHLORIDE 9 MG/ML
1000 INJECTION INTRAMUSCULAR; INTRAVENOUS; SUBCUTANEOUS ONCE
Qty: 0 | Refills: 0 | Status: COMPLETED | OUTPATIENT
Start: 2018-08-30 | End: 2018-08-30

## 2018-08-30 RX ADMIN — DIPHENHYDRAMINE HYDROCHLORIDE AND LIDOCAINE HYDROCHLORIDE AND ALUMINUM HYDROXIDE AND MAGNESIUM HYDRO 30 MILLILITER(S): KIT at 13:33

## 2018-08-30 RX ADMIN — SODIUM CHLORIDE 2000 MILLILITER(S): 9 INJECTION INTRAMUSCULAR; INTRAVENOUS; SUBCUTANEOUS at 11:40

## 2018-08-30 NOTE — ED PROVIDER NOTE - NS ED ROS FT
CONSTITUTIONAL: No weakness, fevers or chills  EYES/ENT: No visual changes;  No vertigo or throat pain   NECK: No pain or stiffness. No cervical midline tenderness.  RESPIRATORY: No difficulty breathing, cough, wheezing.  CARDIOVASCULAR: + chest pain. No SOB, or palpitations  GASTROINTESTINAL: + Nausea, vomiting. No abdominal or epigastric pain. No hematemesis. No diarrhea or constipation. No BRBPR, or hematochezia.  GENITOURINARY: No dysuria, frequency or hematuria.  NEUROLOGICAL: No headache. No numbness or weakness.  SKIN: No itching, rashes.

## 2018-08-30 NOTE — ED PROVIDER NOTE - ATTENDING CONTRIBUTION TO CARE
62 y.o. male, PMHx of alcohol and heroin abuse, comes in c/o epigastric pain and nausea for 3 days, vomiting worse after alcohol use. Reports decreased appetite. No fever/chills, SOB, back pain, urinary symptoms. No wt loss. On exam, pt in NAD, AAOx3, head NC/AT, CN II-XII intact, lungs CTA B/L, CV S1S2 regular, abdomen soft/NT/ND/(+)BS, ext (-) edema, motor 5/5x4, sensation intact, rectal (+) soft brown stool, no blood noted. Labs sent. IVF running. Pt tolerating PO. Will reevaluate.

## 2018-08-30 NOTE — ED ADULT NURSE NOTE - OBJECTIVE STATEMENT
Patient c/o LLQ pain with N/V x 4 x days, worse after eating. Last BM was this morning but pt stated he had to strain. Patient denies fevers chills SOB

## 2018-08-30 NOTE — ED PROVIDER NOTE - MEDICAL DECISION MAKING DETAILS
Pt with epigastric pain and alcohol abuse. EKG/labs normal. Pt is tolerating PO. WIll discharge with clinic follow up.

## 2018-08-30 NOTE — ED PROVIDER NOTE - PHYSICAL EXAMINATION
Well appearing NAD non toxic, ETOH odor. NCAT EOMI conjunctiva nml. No nasal discharge. MMM. Neck supple, non tender, full ROM. RRR no MRG +S1S2. CTA b/l. Abd s NT ND +BS. Ext WWP x4, moving all extremities, no edema. 2+ equal pulses throughout. Cooperative, appropriate. no ataxia, EOM ROM without deficits.

## 2018-08-30 NOTE — ED PROVIDER NOTE - OBJECTIVE STATEMENT
63 y/o PMHx of alcohol and heroine abuse, DM on insulin, 61 y/o PMHx of alcohol and heroine abuse presenting to ED for chest pain. Patient states he has non-pleuritic, non-positional chest pain since 3 days ago, black stools for 3 days and NBNB emesis for 2 weeks. Patient states his apeitite is decreased, tolerating PO fluids. States he had an endoscopy in the past but does not recall them finding any abnormality. Patient states he was recently in detox, last used heroine/alcohol 3 days ago. Denies tremors/hallucinations/diarrhea/constipation/fever/chill/cough.

## 2018-08-31 DIAGNOSIS — F43.10 POST-TRAUMATIC STRESS DISORDER, UNSPECIFIED: ICD-10-CM
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
## 2018-08-31 DIAGNOSIS — F10.20 ALCOHOL DEPENDENCE, UNCOMPLICATED: ICD-10-CM

## 2018-08-31 DIAGNOSIS — Z59.0 HOMELESSNESS: ICD-10-CM

## 2018-08-31 DIAGNOSIS — F11.20 OPIOID DEPENDENCE, UNCOMPLICATED: ICD-10-CM
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
## 2018-08-31 DIAGNOSIS — Z86.73 PERSONAL HISTORY OF TRANSIENT ISCHEMIC ATTACK (TIA), AND CEREBRAL INFARCTION WITHOUT RESIDUAL DEFICITS: ICD-10-CM

## 2018-08-31 SDOH — ECONOMIC STABILITY - HOUSING INSECURITY: HOMELESSNESS: Z59.0

## 2019-10-03 NOTE — ED ADULT NURSE NOTE - FINAL NURSING ELECTRONIC SIGNATURE
Writer spoke with Gwen Griffin (Pt's sister) who called to follow-up regarding previous discussion about potentially becoming the pt's Representative Payee  Writer was able to answer a few questions  Writer also directed sister to [employer] website where details regarding Rep-Payee responsibilities are detailed  Sister indicated that she will be reading the information and will get back to staff regarding decision and potential next steps  10-Apr-2018 06:29

## 2023-10-17 NOTE — ED ADULT NURSE NOTE - PATIENT DISCHARGE SIGNATURE
Airway    Performed by: Bam Leahy CRNA  Authorized by: Diana Galdamez MD    Final Airway Type:  Endotracheal airway  Final Endotracheal Airway*:  ETT  ETT Size (mm)*:  7.5  Cuff*:  Regular  Technique Used for Successful ETT Placement:  Direct laryngoscopy  Devices/Methods Used in Placement*:  Mask  Intubation Procedure*:  Preoxygenation, ETCO2, Atraumatic, Dentition Unchanged and Pharynx Clear  Insertion Site:  Oral  Blade Type*:  MAC  Blade Size*:  3  Cuff Volume (mL):  5  Measured from*:  Lips  Secured at (cm)*:  21  Placement Verified by: auscultation and capnometry    Glottic View*:  1 - full view of glottis  Attempts*:  1  Ventilation Between Attempts:  Bag valve  Number of Other Approaches Attempted:  0   Patient Identified, Procedure confirmed, Emergency equipment available and Safety protocols followed  Location:  OR  Urgency:  Elective  Difficult Airway: No    Indications for Airway Management:  Anesthesia  Spontaneous Ventilation: absent    Sedation Level:  Anesthetized  Mask Difficulty Assessment:  1 - vent by mask  Start Time: 10/17/2023 2:59 PM       03-Dec-2017

## 2024-09-13 NOTE — ED ADULT TRIAGE NOTE - WEIGHT IN KG
Pt denies c/o pain, malaise or cardiac symptoms. A&Ox4. Lungs diminished bilaterally with equal expansion, on room air. Pt NSR/ST on monitor with regular rate. Abdomen soft and non-tender with active bowel sounds in all four quadrants. Continent of B&B. Groin incisions, soft with bruising. Pt and family members updated with plan of care.    Problem: Patient/Family Goals  Goal: Patient/Family Long Term Goal  Description: Patient's Long Term Goal: Control BP    Interventions:  - Take meds as prescribed.   - [place]    Outcome: Progressing  Goal: Patient/Family Short Term Goal  Description: Patient's Short Term Goal: to get up and walk around    9/11 NOC \" able to sleep\"    Interventions:   - follow md [facility]  -cluster care  - sleep aid kit    Outcome: Progressing     Problem: CARDIOVASCULAR - ADULT  Goal: Maintains optimal cardiac output and hemodynamic stability  Description: INTERVENTIONS:  - Monitor vital signs, rhythm, and trends  - Monitor for bleeding, hypotension and signs of decreased cardiac output  - Evaluate effectiveness of vasoactive medications to optimize hemodynamic stability  - Monitor arterial and/or venous puncture sites for bleeding and/or hematoma  - Assess quality of pulses, skin color and temperature  - Assess for signs of decreased coronary artery perfusion - ex. Angina  - Evaluate fluid balance, assess for edema, trend weights  Outcome: Progressing  Goal: Absence of cardiac arrhythmias or at baseline  Description: INTERVENTIONS:  - Continuous cardiac monitoring, monitor vital signs, obtain 12 lead EKG if indicated  - Evaluate effectiveness of antiarrhythmic and heart rate control medications as ordered  - Initiate emergency measures for life threatening arrhythmias  - Monitor electrolytes and administer replacement therapy as ordered  Outcome: Progressing     Problem: METABOLIC/FLUID AND ELECTROLYTES - ADULT  Goal: Electrolytes maintained within normal limits  Description:  INTERVENTIONS:  - Monitor labs and rhythm and assess patient for signs and symptoms of electrolyte imbalances  - Administer electrolyte replacement as ordered  - Monitor response to electrolyte replacements, including rhythm and repeat lab results as appropriate  - Fluid restriction as ordered  - Instruct patient on fluid and nutrition restrictions as appropriate  Outcome: Progressing     Problem: SKIN/TISSUE INTEGRITY - ADULT  Goal: Incision(s), wounds(s) or drain site(s) healing without S/S of infection  Description: INTERVENTIONS:  - Assess and document dressing/incision and surrounding tissue  Outcome: Progressing     Problem: Diabetes/Glucose Control  Goal: Glucose maintained within prescribed range  Description: INTERVENTIONS:  - Monitor Blood Glucose as ordered  - Assess for signs and symptoms of hyperglycemia and hypoglycemia  - Administer ordered medications to maintain glucose within target range  - Assess barriers to adequate nutritional intake and initiate nutrition consult as needed  - Instruct patient on self management of diabetes  Outcome: Progressing      90.7